# Patient Record
Sex: MALE | Race: WHITE | HISPANIC OR LATINO | Employment: FULL TIME | URBAN - METROPOLITAN AREA
[De-identification: names, ages, dates, MRNs, and addresses within clinical notes are randomized per-mention and may not be internally consistent; named-entity substitution may affect disease eponyms.]

---

## 2017-01-26 ENCOUNTER — OFFICE VISIT (OUTPATIENT)
Dept: MEDICAL GROUP | Facility: CLINIC | Age: 52
End: 2017-01-26
Payer: COMMERCIAL

## 2017-01-26 VITALS
WEIGHT: 163 LBS | SYSTOLIC BLOOD PRESSURE: 122 MMHG | BODY MASS INDEX: 30 KG/M2 | HEART RATE: 71 BPM | DIASTOLIC BLOOD PRESSURE: 70 MMHG | RESPIRATION RATE: 16 BRPM | OXYGEN SATURATION: 98 % | HEIGHT: 62 IN | TEMPERATURE: 97.6 F

## 2017-01-26 DIAGNOSIS — R10.32 LEFT GROIN PAIN: ICD-10-CM

## 2017-01-26 DIAGNOSIS — E78.2 HYPERLIPEMIA, MIXED: ICD-10-CM

## 2017-01-26 DIAGNOSIS — R10.9 LEFT FLANK PAIN: ICD-10-CM

## 2017-01-26 DIAGNOSIS — R73.02 IGT (IMPAIRED GLUCOSE TOLERANCE): ICD-10-CM

## 2017-01-26 LAB
APPEARANCE UR: CLEAR
BILIRUB UR STRIP-MCNC: NORMAL MG/DL
COLOR UR AUTO: YELLOW
GLUCOSE UR STRIP.AUTO-MCNC: NORMAL MG/DL
KETONES UR STRIP.AUTO-MCNC: NORMAL MG/DL
LEUKOCYTE ESTERASE UR QL STRIP.AUTO: NORMAL
NITRITE UR QL STRIP.AUTO: NORMAL
PH UR STRIP.AUTO: 5 [PH] (ref 5–8)
PROT UR QL STRIP: NORMAL MG/DL
RBC UR QL AUTO: NORMAL
SP GR UR STRIP.AUTO: 1.03
UROBILINOGEN UR STRIP-MCNC: NORMAL MG/DL

## 2017-01-26 PROCEDURE — 81002 URINALYSIS NONAUTO W/O SCOPE: CPT | Performed by: NURSE PRACTITIONER

## 2017-01-26 PROCEDURE — 99214 OFFICE O/P EST MOD 30 MIN: CPT | Performed by: NURSE PRACTITIONER

## 2017-01-26 RX ORDER — ERYTHROMYCIN 5 MG/G
OINTMENT OPHTHALMIC
Qty: 3.5 G | Refills: 5 | Status: SHIPPED | OUTPATIENT
Start: 2017-01-26 | End: 2018-02-06 | Stop reason: SDUPTHER

## 2017-01-26 NOTE — MR AVS SNAPSHOT
"        Zaire Anderson   2017 9:00 AM   Office Visit   MRN: [id number]    Department:  [facility]   Dept Phone:  [phone]    Description:  Male : 1965   Provider:  DOM Josue           Reason for Visit     Low Back Pain x 1 month      Allergies as of 2017     No Known Allergies      You were diagnosed with     Left flank pain   [940419]       Left groin pain   [409540]       Hyperlipemia, mixed   [989468]         Vital Signs     Blood Pressure Pulse Temperature Respirations Height Weight    122/70 mmHg 71 36.4 °C (97.6 °F) 16 1.575 m (5' 2\") 73.936 kg (163 lb)    Body Mass Index Oxygen Saturation Smoking Status             29.81 kg/m2 98% Never Smoker          Basic Information     Date Of Birth Sex Race Ethnicity Preferred Language    1965 Male  or   Origin (Cambodian,Pakistani,Turkmen,Doroteo, etc) English      Problem List              ICD-10-CM Priority Class Noted - Resolved    Hyperlipemia, mixed E78.2   2013 - Present    IGT (impaired glucose tolerance) R73.02   2016 - Present      Health Maintenance        Date Due Completion Dates    IMM DTaP/Tdap/Td Vaccine (1 - Tdap) 1986 ---    IMM INFLUENZA (1) 2016 ---            Results     POCT Urinalysis      Component Value Standard Range & Units    POC Color yellow Negative    POC Appearance clear Negative    POC Leukocyte Esterase neg Negative    POC Nitrites neg Negative    POC Urobiligen neg Negative (0.2) mg/dL    POC Protein neg Negative mg/dL    POC Urine PH 5.0 5.0 - 8.0    POC Blood neg Negative    POC Specific Gravity 1.030 <1.005 - >1.030    POC Ketones neg Negative mg/dL    POC Biliruben neg Negative mg/dL    POC Glucose neg Negative mg/dL                        Current Immunizations     No immunizations on file.      Below and/or attached are the medications your provider expects you to take. Review all of your home medications and newly ordered medications " with your provider and/or pharmacist. Follow medication instructions as directed by your provider and/or pharmacist. Please keep your medication list with you and share with your provider. Update the information when medications are discontinued, doses are changed, or new medications (including over-the-counter products) are added; and carry medication information at all times in the event of emergency situations     Allergies:  No Known Allergies          Medications  Valid as of: January 26, 2017 -  9:28 AM    Generic Name Brand Name Tablet Size Instructions for use    Erythromycin (Ointment) erythromycin 5 MG/GM Apply to lid margins bid prn.        Fenofibrate (Tab) TRICOR 145 MG Take 1 Tab by mouth every day.        .                 Medicines prescribed today were sent to:     [id number] - [place], [place] - [address]    [address] [place] [address]    Phone: [phone] Fax: [phone]    Open 24 Hours?: No      Medication refill instructions:       If your prescription bottle indicates you have medication refills left, it is not necessary to call your provider’s office. Please contact your pharmacy and they will refill your medication.    If your prescription bottle indicates you do not have any refills left, you may request refills at any time through one of the following ways: The online [facility] system (except Urgent Care), by calling your provider’s office, or by asking your pharmacy to contact your provider’s office with a refill request. Medication refills are processed only during regular business hours and may not be available until the next business day. Your provider may request additional information or to have a follow-up visit with you prior to refilling your medication.   *Please Note: Medication refills are assigned a new Rx number when refilled electronically. Your pharmacy may indicate that no refills were authorized even though a new prescription for the same medication is  available at the pharmacy. Please request the medicine by name with the pharmacy before contacting your provider for a refill.        Your To Do List     Future Labs/Procedures Complete By Expires    US-ABDOMEN LIMITED  As directed 1/26/2018    US-RENAL  As directed 1/26/2018         MyChart Access Code: Activation code not generated  Current [facility] Status: Active

## 2017-01-26 NOTE — PROGRESS NOTES
"CC: Low Back Pain        HPI:     Zaire presents today for the followin. Left flank pain/ Left groin pain  Patient is here today with concerns related to some left-sided flank pain that is present if he is lying down. He states if he sitting will radiate down into the left lower quadrant and then down sometimes into his groin. Pain will be present on a daily basis however it is not constant throughout the day. Unable to identify triggers that make it worse.  This been going on for about a month on the flanks. Maybe the last 2 weeks where would radiate to the groin.  Denies any changes to the bowels.  Denies any problems urinating.    No changes in appetite etc. No nausea no vomiting  No history kidney stones    Has not tried any home remedies or medications at home    3. Hyperlipemia, mixed  Continues to take his TriCor. No adverse effects of this medication. No history CAD. No complaints of chest pains.    4. IGT (impaired glucose tolerance)  Is watching his sugar. Does not have a significant family history of diabetes. No reports of polys    Current Outpatient Prescriptions   Medication Sig Dispense Refill   • erythromycin 5 MG/GM Ointment Apply to lid margins bid prn. 3.5 g 5   • fenofibrate (TRICOR) 145 MG Tab Take 1 Tab by mouth every day. 30 Tab 11     No current facility-administered medications for this visit.     Social History   Substance Use Topics   • Smoking status: Never Smoker    • Smokeless tobacco: Never Used   • Alcohol Use: No     I reviewed patients allergies, problem list and medications today in [facility].    ROS: Any/all pertinent positives listed in the HPI, otherwise all others reviewed are negative today.      /70 mmHg  Pulse 71  Temp(Src) 36.4 °C (97.6 °F)  Resp 16  Ht 1.575 m (5' 2\")  Wt 73.936 kg (163 lb)  BMI 29.81 kg/m2  SpO2 98%    Exam:    Gen: Alert and oriented, No apparent distress. WDWN, no visible discomfort  Psych: A+Ox3, normal affect and mood  Skin: Warm, dry and " intact. Good turgor   No rashes in visible areas.  Eye: Conjunctiva clear, lids normal  ENMT: Lips without lesions, good dentition  Lungs: Clear to auscultation bilaterally, no rales or rhonchi   Unlabored respiratory effort.   CV: Regular rate and rhythm, S1, S2. No murmurs.   No Edema  Abd: Soft non tender, non distended. Normal active bowel sounds.    No Hepatosplenomegaly, No pulsatile masses.   Mild tenderness with deep palpation to the far left of the umbilicus. This is difficult to reproduce.  Normal gait. Easily moves from sitting to standing, sitting to supine and back.  Office urinalysis is negative for blood leukocytes nitrates. No protein  No tenderness with palpation of the back including the spinous processes and with the lateral musculature.    No CVA tenderness  Assessment and Plan.   51 y.o. male with the following issues.    1. Left flank pain  Stable. Currently unknown etiology. Renal ultrasound to rule out kidney stone or kidney etiology.  - POCT Urinalysis  - US-RENAL; Future    2. Left groin pain  Stable. Normal exam. Will include ultrasound to rule out hernia  - US-INGUINAL HERNIA; Future    3. Hyperlipemia, mixed  Stable. Recheck lipid panel now that he's been on his TriCor  - LIPID PANEL    4. IGT (impaired glucose tolerance)  Stable. Continue diet changes

## 2017-01-30 ENCOUNTER — HOSPITAL ENCOUNTER (OUTPATIENT)
Dept: LAB | Facility: MEDICAL CENTER | Age: 52
End: 2017-01-30
Attending: NURSE PRACTITIONER
Payer: COMMERCIAL

## 2017-01-30 LAB
CHOLEST SERPL-MCNC: 201 MG/DL (ref 100–199)
HDLC SERPL-MCNC: 28 MG/DL
LDLC SERPL CALC-MCNC: ABNORMAL MG/DL
TRIGL SERPL-MCNC: 582 MG/DL (ref 0–149)

## 2017-01-30 PROCEDURE — 36415 COLL VENOUS BLD VENIPUNCTURE: CPT

## 2017-01-30 PROCEDURE — 80061 LIPID PANEL: CPT

## 2017-01-31 ENCOUNTER — TELEPHONE (OUTPATIENT)
Dept: MEDICAL GROUP | Facility: CLINIC | Age: 52
End: 2017-01-31

## 2017-01-31 DIAGNOSIS — E78.2 HYPERLIPEMIA, MIXED: ICD-10-CM

## 2017-01-31 RX ORDER — ATORVASTATIN CALCIUM 20 MG/1
20 TABLET, FILM COATED ORAL DAILY
Qty: 30 TAB | Refills: 11 | Status: SHIPPED | OUTPATIENT
Start: 2017-01-31 | End: 2018-02-19 | Stop reason: SDUPTHER

## 2017-01-31 NOTE — TELEPHONE ENCOUNTER
Please have continue his tricor.  Needs to remember to take it every day.  Add fish oil 2x./day    Will add lipitor at night as his triglycerides are still high.

## 2017-02-10 ENCOUNTER — HOSPITAL ENCOUNTER (OUTPATIENT)
Dept: RADIOLOGY | Facility: MEDICAL CENTER | Age: 52
End: 2017-02-10
Attending: NURSE PRACTITIONER
Payer: COMMERCIAL

## 2017-02-10 DIAGNOSIS — R10.32 LEFT GROIN PAIN: ICD-10-CM

## 2017-02-10 DIAGNOSIS — R10.9 LEFT FLANK PAIN: ICD-10-CM

## 2017-02-10 PROCEDURE — 76857 US EXAM PELVIC LIMITED: CPT

## 2017-02-10 PROCEDURE — 76775 US EXAM ABDO BACK WALL LIM: CPT

## 2018-02-06 RX ORDER — ERYTHROMYCIN 5 MG/G
OINTMENT OPHTHALMIC
Qty: 3.5 G | Refills: 3 | Status: SHIPPED | OUTPATIENT
Start: 2018-02-06 | End: 2019-05-14

## 2018-02-19 DIAGNOSIS — E78.2 HYPERLIPEMIA, MIXED: ICD-10-CM

## 2018-02-19 RX ORDER — ATORVASTATIN CALCIUM 20 MG/1
20 TABLET, FILM COATED ORAL DAILY
Qty: 90 TAB | Refills: 0 | Status: SHIPPED | OUTPATIENT
Start: 2018-02-19 | End: 2019-01-22

## 2018-02-19 NOTE — TELEPHONE ENCOUNTER
Please call and make sure this patient get an annual Fu.  I can order the labs prior to if he is OK with that

## 2018-03-01 ENCOUNTER — HOSPITAL ENCOUNTER (EMERGENCY)
Facility: MEDICAL CENTER | Age: 53
End: 2018-03-01
Attending: EMERGENCY MEDICINE
Payer: COMMERCIAL

## 2018-03-01 ENCOUNTER — APPOINTMENT (OUTPATIENT)
Dept: RADIOLOGY | Facility: MEDICAL CENTER | Age: 53
End: 2018-03-01
Attending: EMERGENCY MEDICINE
Payer: COMMERCIAL

## 2018-03-01 VITALS
BODY MASS INDEX: 27.96 KG/M2 | SYSTOLIC BLOOD PRESSURE: 193 MMHG | HEIGHT: 64 IN | OXYGEN SATURATION: 96 % | DIASTOLIC BLOOD PRESSURE: 87 MMHG | HEART RATE: 64 BPM | RESPIRATION RATE: 18 BRPM | WEIGHT: 163.8 LBS | TEMPERATURE: 98.6 F

## 2018-03-01 DIAGNOSIS — K80.20 CALCULUS OF GALLBLADDER WITHOUT CHOLECYSTITIS WITHOUT OBSTRUCTION: ICD-10-CM

## 2018-03-01 LAB
ALBUMIN SERPL BCP-MCNC: 4.6 G/DL (ref 3.2–4.9)
ALBUMIN/GLOB SERPL: 1.4 G/DL
ALP SERPL-CCNC: 74 U/L (ref 30–99)
ALT SERPL-CCNC: 21 U/L (ref 2–50)
ANION GAP SERPL CALC-SCNC: 12 MMOL/L (ref 0–11.9)
APPEARANCE UR: ABNORMAL
AST SERPL-CCNC: 17 U/L (ref 12–45)
BACTERIA #/AREA URNS HPF: NEGATIVE /HPF
BASOPHILS # BLD AUTO: 0.8 % (ref 0–1.8)
BASOPHILS # BLD: 0.08 K/UL (ref 0–0.12)
BILIRUB SERPL-MCNC: 0.4 MG/DL (ref 0.1–1.5)
BILIRUB UR QL STRIP.AUTO: NEGATIVE
BUN SERPL-MCNC: 19 MG/DL (ref 8–22)
CALCIUM SERPL-MCNC: 9.5 MG/DL (ref 8.5–10.5)
CHLORIDE SERPL-SCNC: 103 MMOL/L (ref 96–112)
CO2 SERPL-SCNC: 26 MMOL/L (ref 20–33)
COLOR UR: YELLOW
CREAT SERPL-MCNC: 1.16 MG/DL (ref 0.5–1.4)
EOSINOPHIL # BLD AUTO: 0.09 K/UL (ref 0–0.51)
EOSINOPHIL NFR BLD: 0.9 % (ref 0–6.9)
EPI CELLS #/AREA URNS HPF: NEGATIVE /HPF
ERYTHROCYTE [DISTWIDTH] IN BLOOD BY AUTOMATED COUNT: 37.1 FL (ref 35.9–50)
GLOBULIN SER CALC-MCNC: 3.2 G/DL (ref 1.9–3.5)
GLUCOSE SERPL-MCNC: 160 MG/DL (ref 65–99)
GLUCOSE UR STRIP.AUTO-MCNC: 100 MG/DL
HCT VFR BLD AUTO: 45.9 % (ref 42–52)
HGB BLD-MCNC: 16.1 G/DL (ref 14–18)
HYALINE CASTS #/AREA URNS LPF: ABNORMAL /LPF
IMM GRANULOCYTES # BLD AUTO: 0.06 K/UL (ref 0–0.11)
IMM GRANULOCYTES NFR BLD AUTO: 0.6 % (ref 0–0.9)
KETONES UR STRIP.AUTO-MCNC: NEGATIVE MG/DL
LEUKOCYTE ESTERASE UR QL STRIP.AUTO: NEGATIVE
LIPASE SERPL-CCNC: 39 U/L (ref 11–82)
LYMPHOCYTES # BLD AUTO: 2.87 K/UL (ref 1–4.8)
LYMPHOCYTES NFR BLD: 28.9 % (ref 22–41)
MCH RBC QN AUTO: 29.9 PG (ref 27–33)
MCHC RBC AUTO-ENTMCNC: 35.1 G/DL (ref 33.7–35.3)
MCV RBC AUTO: 85.2 FL (ref 81.4–97.8)
MICRO URNS: ABNORMAL
MONOCYTES # BLD AUTO: 0.39 K/UL (ref 0–0.85)
MONOCYTES NFR BLD AUTO: 3.9 % (ref 0–13.4)
NEUTROPHILS # BLD AUTO: 6.43 K/UL (ref 1.82–7.42)
NEUTROPHILS NFR BLD: 64.9 % (ref 44–72)
NITRITE UR QL STRIP.AUTO: NEGATIVE
NRBC # BLD AUTO: 0 K/UL
NRBC BLD-RTO: 0 /100 WBC
PH UR STRIP.AUTO: 7.5 [PH]
PLATELET # BLD AUTO: 286 K/UL (ref 164–446)
PMV BLD AUTO: 9.3 FL (ref 9–12.9)
POTASSIUM SERPL-SCNC: 3.4 MMOL/L (ref 3.6–5.5)
PROT SERPL-MCNC: 7.8 G/DL (ref 6–8.2)
PROT UR QL STRIP: NEGATIVE MG/DL
RBC # BLD AUTO: 5.39 M/UL (ref 4.7–6.1)
RBC # URNS HPF: ABNORMAL /HPF
RBC UR QL AUTO: NEGATIVE
SODIUM SERPL-SCNC: 141 MMOL/L (ref 135–145)
SP GR UR STRIP.AUTO: 1.01
TROPONIN I SERPL-MCNC: <0.01 NG/ML (ref 0–0.04)
UROBILINOGEN UR STRIP.AUTO-MCNC: 0.2 MG/DL
WBC # BLD AUTO: 9.9 K/UL (ref 4.8–10.8)
WBC #/AREA URNS HPF: ABNORMAL /HPF

## 2018-03-01 PROCEDURE — 76705 ECHO EXAM OF ABDOMEN: CPT

## 2018-03-01 PROCEDURE — 36415 COLL VENOUS BLD VENIPUNCTURE: CPT

## 2018-03-01 PROCEDURE — A9270 NON-COVERED ITEM OR SERVICE: HCPCS | Performed by: EMERGENCY MEDICINE

## 2018-03-01 PROCEDURE — 99284 EMERGENCY DEPT VISIT MOD MDM: CPT

## 2018-03-01 PROCEDURE — 85025 COMPLETE CBC W/AUTO DIFF WBC: CPT

## 2018-03-01 PROCEDURE — 81001 URINALYSIS AUTO W/SCOPE: CPT

## 2018-03-01 PROCEDURE — 80053 COMPREHEN METABOLIC PANEL: CPT

## 2018-03-01 PROCEDURE — 83690 ASSAY OF LIPASE: CPT

## 2018-03-01 PROCEDURE — 84484 ASSAY OF TROPONIN QUANT: CPT

## 2018-03-01 PROCEDURE — 93005 ELECTROCARDIOGRAM TRACING: CPT | Performed by: EMERGENCY MEDICINE

## 2018-03-01 PROCEDURE — 700102 HCHG RX REV CODE 250 W/ 637 OVERRIDE(OP): Performed by: EMERGENCY MEDICINE

## 2018-03-01 RX ORDER — NAPROXEN 500 MG/1
500 TABLET ORAL 2 TIMES DAILY PRN
Qty: 30 TAB | Refills: 0 | Status: SHIPPED | OUTPATIENT
Start: 2018-03-01 | End: 2019-05-14

## 2018-03-01 RX ORDER — HYDROCODONE BITARTRATE AND ACETAMINOPHEN 5; 325 MG/1; MG/1
1 TABLET ORAL ONCE
Status: COMPLETED | OUTPATIENT
Start: 2018-03-01 | End: 2018-03-01

## 2018-03-01 RX ADMIN — HYDROCODONE BITARTRATE AND ACETAMINOPHEN 1 TABLET: 5; 325 TABLET ORAL at 02:54

## 2018-03-01 ASSESSMENT — PAIN SCALES - GENERAL
PAINLEVEL_OUTOF10: 10
PAINLEVEL_OUTOF10: 10

## 2018-03-01 NOTE — ED TRIAGE NOTES
Zaire Anderson  52 y.o.  male  Chief Complaint   Patient presents with   • Abdominal Pain     Present to triage c/o abdominal pain ( right side ) x 3 hrs. Described as constant pressure / burning pain. Vomited in triage moderate amount.

## 2018-03-01 NOTE — DISCHARGE INSTRUCTIONS
Cólico biliar  (Biliary Colic)  El cólico biliar es un dolor en la región superior del abdomen. El dolor:  · Generalmente, se siente en la región superior derecha del abdomen, татьяна también puede aparecer en la región central, rey debajo del esternón.  · Puede irradiarse a la espalda, hacia el omóplato derecho.  · Puede ser aldair o discontinuo.  · Puede estar acompañado de náuseas y vómitos.  La mayor parte del tiempo, desaparece en el término de 1 a 5 horas. Vik vez que el dolor intenso desaparece, puede francesca dolor abdominal leve que se prolonga aaliyah aproximadamente 24 horas.  La causa del cólico biliar es vik obstrucción en las vías biliares. Las vías biliares son conductos que transportan bilis, un líquido que ayuda a digerir las grasas, desde la vesícula biliar hasta el intestino lozoya. Generalmente, el cólico biliar ocurre después de comer, cuando el sistema digestivo necesita bilis. El dolor aparece cuando las células musculares se contraen bruscamente para intentar  la obstrucción, para que la bilis pueda pasar.  INSTRUCCIONES PARA EL CUIDADO EN EL HOGAR  · [place] los medicamentos solamente abdi se lo haya indicado el médico.  · Jasmine suficiente líquido para mantener la orina dave o de color amarillo pálido.  · Evite los alimentos grasos y fritos. Aggie tipo de alimentos aumentan la demanda de bilis del organismo.  · Evite los alimentos que intensifican el dolor.  · No coma en exceso.  · No ingiera vik comida abundante después de ayunar.  SOLICITE ATENCIÓN MÉDICA SI:  · Tiene fiebre.  · El dolor empeora.  · Vomita.  · Tiene náuseas que le impiden la ingesta de comidas y bebidas.  SOLICITE ATENCIÓN MÉDICA DE INMEDIATO SI:  · De manera repentina, tiene fiebre y escalofríos.  · Observa vik coloración amarillenta (ictericia) de:  ¨ La piel.  ¨ Las partes sai de los ojos.  ¨ Las membranas mucosas.  · Siente dolor continuo o intenso que no se alvaro con los medicamentos.  · Tiene náuseas y vómitos  que no se alivian con los medicamentos.  · Tiene mareos o se desmaya.     Esta información no tiene abdi fin reemplazar el consejo del médico. Asegúrese de hacerle al médico cualquier pregunta que tenga.     Document Released: 03/26/2009 Document Revised: 05/03/2016  Elsevier Interactive Patient Education ©2016 Elsevier Inc.

## 2018-03-01 NOTE — ED NOTES
.All lines and monitors discontinued. Discharge instructions given, questions answered.    Ambulated out of ER, escorted by Spouse.  Instructed not to drive after taking pain medication and pt verbalizes understanding.

## 2018-03-01 NOTE — ED PROVIDER NOTES
"ED Provider Note    Scribed for Michael Jimenez M.D. by Severino Eagle. 3/1/2018, 2:30 AM.    Primary care provider: DOM Josue  Means of arrival: Walk-In  History obtained from: Patient  History limited by: None    CHIEF COMPLAINT  Chief Complaint   Patient presents with   • Abdominal Pain     HPI  Zaire Anderson is a 52 y.o. male who presents to the Emergency Department complaining gradual onset of moderate constant non-radiating right sided abdominal pain. This pain began 3 hours ago accompanied with nausea and one episode of vomiting in triage.   He denies any similar symptoms in the past. Last meal was 7 hours ago. No modifying factors noted.     Denies diarrhea, chest pain, shortness of breath.    REVIEW OF SYSTEMS  See HPI,  Negative for diarrhea, shortness of breath. Remainder of ROS negative.    C.    PAST MEDICAL HISTORY   has a past medical history of Hypercholesteremia.    SURGICAL HISTORY  patient denies any surgical history    SOCIAL HISTORY  Social History   Substance Use Topics   • Smoking status: Never Smoker   • Smokeless tobacco: Never Used   • Alcohol use No      History   Drug Use No     FAMILY HISTORY  Family History   Problem Relation Age of Onset   • Cancer Mother 67     colon ca-survived   • Hypertension Father    • Hyperlipidemia Father    • Cancer Maternal Grandmother      unk   • Diabetes Paternal Grandmother    • Cancer Paternal Grandfather 96     prostate CA-very old     CURRENT MEDICATIONS  Reviewed.  See Encounter Summary.     ALLERGIES  No Known Allergies    PHYSICAL EXAM  VITAL SIGNS: BP (!) 193/87   Pulse 64   Temp 37 °C (98.6 °F)   Resp 18   Ht 1.626 m (5' 4\")   Wt 74.3 kg (163 lb 12.8 oz)   SpO2 97%   BMI 28.12 kg/m²   Constitutional: Awake, alert in no apparent distress.  HENT: Normocephalic, Bilateral external ears normal. Nose normal.   Eyes: Conjunctiva normal, non-icteric, EOMI.    Thorax & Lungs: Easy unlabored respirations, Clear to " ascultation bilaterally.  Cardiovascular: Regular rate, Regular rhythm, No murmurs, rubs or gallops.  Abdomen:  Soft, Normal active bowel signs, Mild RUQ tenderness. Negative murphys. No RLQ tenderness, no rebound, no guarding.   Skin: Visualized skin is  Dry, No erythema, No rash.   Extremities:   No cyanosis, clubbing or edema.  Neurologic: Alert, Grossly non-focal.   Psychiatric: Normal affect, Normal mood    DIAGNOSTIC STUDIES / PROCEDURES     EKG  Interpreted by me    Rhythm:  Normal sinus rhythm   Rate: 86  Axis: normal  Ectopy: none  Conduction: normal  ST Segments: no acute change  T Waves: no acute change  Q Waves: none  Clinical Impression: Normal EKG without acute changes     RADIOLOGY  US-GALLBLADDER   Final Result      Cholelithiasis, with borderline gallbladder wall thickening. No pericholecystic fluid however.        The radiologist's interpretation of all radiological studies have been reviewed by me.    COURSE & MEDICAL DECISION MAKING  Pertinent Labs & Imaging studies reviewed. (See chart for details)    2:30 AM - Patient seen and examined at bedside. Patient will be treated with 1 Tab Norco. Ordered US-Gallbladder, CBC, CMP, Lipase, Troponin, Urinalysis to evaluate his symptoms.     4:45 AM- informed patient of US results. He is pain free at this time. Repeat abdominal exam is non tender.      Decision Making:  This is a 52 y.o. year old male who presents with RUQ abdominal pain. I initially considered cardiac etiology, given the uncontrolled BP. EKG and trop are WNL and the pain is clearly reproducible. Labs are unremarkable. Normal WBC, normal lipase, normal LFTs, normal bili. US reveals cholelithiasis. The wall is borderline in thickness, however clinically he does not have acute cholecystitis. The patient was pain free upon discharge. I discussed biliary colic with the patient and recommend he follow up with general surgery for possible lap nikita. He was instructed on dietary modifications to  avoid flare ups.   He was directed to return for severe pain, fevers, intractable vomiting, chest pain, shortness of breath or any other concern.     The patient was discharged to home in good condition.     FINAL IMPRESSION  1. Calculus of gallbladder without cholecystitis without obstruction          Severino BOWMAN (Scribe), am scribing for, and in the presence of, Michael Jimenez M.D..    Electronically signed by: Severino Eagle (Concepcionibe), 3/1/2018    Michael BOWMAN M.D. personally performed the services described in this documentation, as scribed by Severino Eagle in my presence, and it is both accurate and complete.

## 2018-03-26 LAB — EKG IMPRESSION: NORMAL

## 2018-10-12 DIAGNOSIS — E78.2 HYPERLIPEMIA, MIXED: ICD-10-CM

## 2018-10-12 RX ORDER — ATORVASTATIN CALCIUM 20 MG/1
20 TABLET, FILM COATED ORAL DAILY
Qty: 30 TAB | Refills: 1 | Status: SHIPPED | OUTPATIENT
Start: 2018-10-12 | End: 2019-01-22 | Stop reason: SDUPTHER

## 2018-10-12 RX ORDER — FENOFIBRATE 145 MG/1
TABLET, COATED ORAL
Qty: 30 TAB | Refills: 1 | Status: SHIPPED | OUTPATIENT
Start: 2018-10-12 | End: 2018-12-14 | Stop reason: SDUPTHER

## 2018-10-12 RX ORDER — ERYTHROMYCIN 5 MG/G
OINTMENT OPHTHALMIC
Qty: 3.5 G | Refills: 3 | Status: SHIPPED | OUTPATIENT
Start: 2018-10-12 | End: 2019-05-14

## 2019-01-22 DIAGNOSIS — E78.2 HYPERLIPEMIA, MIXED: ICD-10-CM

## 2019-01-22 RX ORDER — ATORVASTATIN CALCIUM 20 MG/1
TABLET, FILM COATED ORAL
Qty: 90 TAB | Refills: 1 | Status: SHIPPED | OUTPATIENT
Start: 2019-01-22 | End: 2019-05-14 | Stop reason: SDUPTHER

## 2019-05-14 ENCOUNTER — OFFICE VISIT (OUTPATIENT)
Dept: MEDICAL GROUP | Facility: MEDICAL CENTER | Age: 54
End: 2019-05-14
Payer: COMMERCIAL

## 2019-05-14 VITALS
BODY MASS INDEX: 27.49 KG/M2 | WEIGHT: 161 LBS | TEMPERATURE: 98.7 F | SYSTOLIC BLOOD PRESSURE: 140 MMHG | HEIGHT: 64 IN | RESPIRATION RATE: 14 BRPM | HEART RATE: 72 BPM | DIASTOLIC BLOOD PRESSURE: 82 MMHG | OXYGEN SATURATION: 96 %

## 2019-05-14 DIAGNOSIS — R03.0 ELEVATED BP WITHOUT DIAGNOSIS OF HYPERTENSION: ICD-10-CM

## 2019-05-14 DIAGNOSIS — Z12.11 COLON CANCER SCREENING: ICD-10-CM

## 2019-05-14 DIAGNOSIS — M79.605 LEFT LEG PAIN: ICD-10-CM

## 2019-05-14 DIAGNOSIS — Z13.29 SCREENING FOR THYROID DISORDER: ICD-10-CM

## 2019-05-14 DIAGNOSIS — R21 RASH OF HANDS: ICD-10-CM

## 2019-05-14 DIAGNOSIS — R73.02 IGT (IMPAIRED GLUCOSE TOLERANCE): ICD-10-CM

## 2019-05-14 DIAGNOSIS — Z12.5 SCREENING FOR PROSTATE CANCER: ICD-10-CM

## 2019-05-14 DIAGNOSIS — E78.2 HYPERLIPEMIA, MIXED: ICD-10-CM

## 2019-05-14 DIAGNOSIS — H53.9 VISION CHANGES: ICD-10-CM

## 2019-05-14 PROBLEM — I10 HYPERTENSION: Status: ACTIVE | Noted: 2019-05-14

## 2019-05-14 PROCEDURE — 99214 OFFICE O/P EST MOD 30 MIN: CPT | Performed by: NURSE PRACTITIONER

## 2019-05-14 RX ORDER — FENOFIBRATE 145 MG/1
145 TABLET, COATED ORAL
Qty: 90 TAB | Refills: 1 | Status: SHIPPED | OUTPATIENT
Start: 2019-05-14 | End: 2019-10-21 | Stop reason: SDUPTHER

## 2019-05-14 RX ORDER — ATORVASTATIN CALCIUM 20 MG/1
20 TABLET, FILM COATED ORAL
Qty: 90 TAB | Refills: 1 | Status: SHIPPED | OUTPATIENT
Start: 2019-05-14 | End: 2019-10-08

## 2019-05-14 RX ORDER — LISINOPRIL 10 MG/1
10 TABLET ORAL DAILY
Qty: 90 TAB | Refills: 1 | Status: SHIPPED | OUTPATIENT
Start: 2019-05-14 | End: 2019-10-21 | Stop reason: SDUPTHER

## 2019-05-14 RX ORDER — CLOBETASOL PROPIONATE 0.5 MG/G
1 OINTMENT TOPICAL 2 TIMES DAILY
Qty: 30 G | Refills: 3 | Status: SHIPPED | OUTPATIENT
Start: 2019-05-14 | End: 2019-11-03 | Stop reason: SDUPTHER

## 2019-05-14 ASSESSMENT — PATIENT HEALTH QUESTIONNAIRE - PHQ9: CLINICAL INTERPRETATION OF PHQ2 SCORE: 0

## 2019-05-14 NOTE — PROGRESS NOTES
CC: Eye Problem (Vision changes; 1 month ago, left leg wasn't hurting but when being touched, it caused pain. Stopped 1 week ago.)        HPI:     Zaire presents today for the followin. Vision changes  States is been noticing for some time that he has been having blurred vision if he tries to read really small prescription however if he backs up he can read the prescription without any problems.  Does occasionally get red eyes.  Occasionally his eyes are dry.  Previously would use an erythromycin ointment for this however not recently.  Wants to know if that would be helpful    2. Left leg pain  States about a month ago he had 2 weeks where he would have a weird sensation on the top of his left upper leg/quad.  States no pain with walking his leg normally however if he touched it with his hand it would feel numb sometimes tingly's, sometimes burning.  This completely resolved about a week ago.  He tried icy hot, unsure if that helped    3. Hyperlipemia, mixed  Does still take TriCor and atorvastatin however he admits he only takes it sometimes    4. IGT (impaired glucose tolerance)  History of impaired glucose tolerance, no recent checks.  No reports of polys    5. Elevated BP without diagnosis of hypertension  States he has been checking his blood pressure over the last year at the pharmacy and will get numbers with systolics between 140s and 150s.  Diastolics 85 and above.  Has never been diagnosed with hypertension.  Does not take medication for blood pressure previously    9. Rash of hands  States chronically for years he has had a purely red rash on his left palm.  He states he was given some sort of creams which would help in the beginning however the rash returned after he stopped using them.  At some point he also saw a dermatologist which gave him an unknown cream but the similar process happened.    Current Outpatient Prescriptions   Medication Sig Dispense Refill   • atorvastatin (LIPITOR) 20 MG Tab  "Take 1 Tab by mouth every day. 90 Tab 1   • fenofibrate (TRICOR) 145 MG Tab Take 1 Tab by mouth every day. 90 Tab 1   • clobetasol (TEMOVATE) 0.05 % Ointment Apply 1 Application to affected area(s) 2 times a day. 30 g 3   • lisinopril (PRINIVIL) 10 MG Tab Take 1 Tab by mouth every day. 90 Tab 1     No current facility-administered medications for this visit.      Social History   Substance Use Topics   • Smoking status: Never Smoker   • Smokeless tobacco: Never Used   • Alcohol use No     I reviewed patients allergies, problem list and medications today in [facility].    ROS: Any/all pertinent positives listed in the HPI, otherwise all others reviewed are negative today.      /82 (BP Location: Left arm, Patient Position: Sitting, BP Cuff Size: Adult)   Pulse 72   Temp 37.1 °C (98.7 °F) (Temporal)   Resp 14   Ht 1.626 m (5' 4\")   Wt 73 kg (161 lb)   SpO2 96%   BMI 27.64 kg/m²     Exam:    Gen: Alert and oriented, No apparent distress. WDWN  Psych: A+Ox3, normal affect and mood  Skin: Warm, dry and intact. Good turgor   No rashes in visible areas.  Eye: Conjunctiva clear, lids normal  ENMT: Lips without lesions, good dentition  Neck: No Lymphadenopathy, Thyromegaly, Bruits.   Trachea midline, no masses  Lungs: Clear to auscultation bilaterally, no rales or rhonchi   Unlabored respiratory effort.   CV: Regular rate and rhythm, S1, S2. No murmurs.   No Edema  Manual blood pressure, left arm, sitting 158/88      Assessment and Plan.   53 y.o. male with the following issues.    1. Vision changes  Recommend seeing a optometrist to see if he needs reading glasses.  At this point I do not recommend using recurrent antibiotic ointments without an infection.  We discussed he does have a pterygium which may be contributing to some dryness and redness    2. Left leg pain  Resolved at this point.    3. Hyperlipemia, mixed  At this point I think it would be in his best interest to start taking the medications daily and in 2 " months ago for lab work at that point.  Discussed this will allow me to adjust dosing as needed  - Lipid Profile; Future  - Comp Metabolic Panel; Future  - atorvastatin (LIPITOR) 20 MG Tab; Take 1 Tab by mouth every day.  Dispense: 90 Tab; Refill: 1  - fenofibrate (TRICOR) 145 MG Tab; Take 1 Tab by mouth every day.  Dispense: 90 Tab; Refill: 1    4. IGT (impaired glucose tolerance)  Patient will continue healthy diet, low sugar content.  Monitor labs  - HEMOGLOBIN A1C; Future  - Comp Metabolic Panel; Future    5. Screening for thyroid disorder  Ordered  - TSH; Future    6. Screening for prostate cancer  Ordered  - PROSTATE SPECIFIC AG    7. Colon cancer screening  Ordered.  Patient is low risk with no family history of colon cancer  - COLOGUARD (FIT DNA)     hypertension  Start lisinopril once a day.  Recheck BP when he returns to me in 2 months after labs.  - lisinopril (PRINIVIL) 10 MG Tab; Take 1 Tab by mouth every day.  Dispense: 90 Tab; Refill: 1    9. Rash of hands  Trial of clobetasol.  We discussed emollients.  We discussed he could use a glove over his hand after he treats it.  Discussed twice daily for the next couple weeks and then hopefully he is able to reduce to daily or cure every other day.  If he does not get good improvement we will refer back to dermatology  - clobetasol (TEMOVATE) 0.05 % Ointment; Apply 1 Application to affected area(s) 2 times a day.  Dispense: 30 g; Refill: 3

## 2019-09-30 ENCOUNTER — OFFICE VISIT (OUTPATIENT)
Dept: MEDICAL GROUP | Facility: MEDICAL CENTER | Age: 54
End: 2019-09-30
Payer: COMMERCIAL

## 2019-09-30 VITALS
WEIGHT: 160.4 LBS | HEIGHT: 64 IN | TEMPERATURE: 98.3 F | SYSTOLIC BLOOD PRESSURE: 142 MMHG | HEART RATE: 77 BPM | BODY MASS INDEX: 27.39 KG/M2 | DIASTOLIC BLOOD PRESSURE: 76 MMHG | OXYGEN SATURATION: 96 %

## 2019-09-30 DIAGNOSIS — R73.02 IGT (IMPAIRED GLUCOSE TOLERANCE): ICD-10-CM

## 2019-09-30 DIAGNOSIS — R22.1 NECK MASS: ICD-10-CM

## 2019-09-30 DIAGNOSIS — E78.2 HYPERLIPEMIA, MIXED: ICD-10-CM

## 2019-09-30 PROCEDURE — 99214 OFFICE O/P EST MOD 30 MIN: CPT | Performed by: NURSE PRACTITIONER

## 2019-09-30 NOTE — PROGRESS NOTES
"CC: Nodule        HPI:     Zaire presents today for the followin. Neck mass  Here today stating that for the last month he is noticed a bump on the left side of his neck.  Does not hurt.  Does not seem to be growing.  However states has not shrunk or gone away.  States he had been really tense in his neck and back and he was not sure if this was the precipitating reason.  Denies other associated symptoms such as fevers.    2. IGT (impaired glucose tolerance)  Has a history of some prediabetes.  He had pending labs that were ordered back in May however lost the paperwork    3. Hyperlipemia, mixed  On statin.  Had pending labs that were ordered in May however lost the paperwork.    Current Outpatient Medications   Medication Sig Dispense Refill   • atorvastatin (LIPITOR) 20 MG Tab Take 1 Tab by mouth every day. 90 Tab 1   • fenofibrate (TRICOR) 145 MG Tab Take 1 Tab by mouth every day. 90 Tab 1   • clobetasol (TEMOVATE) 0.05 % Ointment Apply 1 Application to affected area(s) 2 times a day. 30 g 3   • lisinopril (PRINIVIL) 10 MG Tab Take 1 Tab by mouth every day. 90 Tab 1     No current facility-administered medications for this visit.      Social History     Tobacco Use   • Smoking status: Never Smoker   • Smokeless tobacco: Never Used   Substance Use Topics   • Alcohol use: No   • Drug use: No     I reviewed patients allergies, problem list and medications today in [facility].    ROS: Any/all pertinent positives listed in the HPI, otherwise all others reviewed are negative today.      /76 (BP Location: Right arm, Patient Position: Sitting, BP Cuff Size: Adult)   Pulse 77   Temp 36.8 °C (98.3 °F) (Temporal)   Ht 1.626 m (5' 4\")   Wt 72.8 kg (160 lb 6.4 oz)   SpO2 96%   BMI 27.53 kg/m²     Exam:   Gen: Alert and oriented, No apparent distress. WDWN  Psych: A+Ox3, normal affect and mood  Skin: Warm, dry and intact. Good turgor   No rashes in visible areas.  Eye: Conjunctiva clear, lids normal  ENMT: Lips " without lesions, good dentition   Oropharynx clear.   Neck: No additional lymphadenopathy, Thyromegaly, Bruits.   Trachea midline  Left side of the neck around the area of the bottom of the posterior cervical chain there is an approximate 1 cm firm slightly mobile subcutaneous mass.  No other associated lymphadenopathy  Lungs: Clear to auscultation bilaterally, no rales or rhonchi   Unlabored respiratory effort.   CV: Regular rate and rhythm, S1, S2. No murmurs.   No Edema  Gait normal      Assessment and Plan.   53 y.o. male with the following issues.    1. Neck mass  Stable.  Feels consistent with a lipoma.  Ultrasound pending.  Discussed depending on what type of tissue it may or may not resolve.  - US-SOFT TISSUES OF HEAD - NECK; Future  - CBC WITH DIFFERENTIAL; Future    2. IGT (impaired glucose tolerance)  Pending labs.  These were printed out for him and I reviewed with him in detail how to make an appointment.    3. Hyperlipemia, mixed  Pending labs      Declines flu shot

## 2019-10-07 ENCOUNTER — HOSPITAL ENCOUNTER (OUTPATIENT)
Dept: LAB | Facility: MEDICAL CENTER | Age: 54
End: 2019-10-07
Attending: NURSE PRACTITIONER
Payer: COMMERCIAL

## 2019-10-07 DIAGNOSIS — R73.02 IGT (IMPAIRED GLUCOSE TOLERANCE): ICD-10-CM

## 2019-10-07 DIAGNOSIS — E78.2 HYPERLIPEMIA, MIXED: ICD-10-CM

## 2019-10-07 DIAGNOSIS — Z13.29 SCREENING FOR THYROID DISORDER: ICD-10-CM

## 2019-10-07 DIAGNOSIS — R22.1 NECK MASS: ICD-10-CM

## 2019-10-07 LAB
ALBUMIN SERPL BCP-MCNC: 4.2 G/DL (ref 3.2–4.9)
ALBUMIN/GLOB SERPL: 1.4 G/DL
ALP SERPL-CCNC: 58 U/L (ref 30–99)
ALT SERPL-CCNC: 21 U/L (ref 2–50)
ANION GAP SERPL CALC-SCNC: 5 MMOL/L (ref 0–11.9)
AST SERPL-CCNC: 17 U/L (ref 12–45)
BASOPHILS # BLD AUTO: 0.9 % (ref 0–1.8)
BASOPHILS # BLD: 0.06 K/UL (ref 0–0.12)
BILIRUB SERPL-MCNC: 0.4 MG/DL (ref 0.1–1.5)
BUN SERPL-MCNC: 15 MG/DL (ref 8–22)
CALCIUM SERPL-MCNC: 8.6 MG/DL (ref 8.5–10.5)
CHLORIDE SERPL-SCNC: 108 MMOL/L (ref 96–112)
CHOLEST SERPL-MCNC: 198 MG/DL (ref 100–199)
CO2 SERPL-SCNC: 27 MMOL/L (ref 20–33)
CREAT SERPL-MCNC: 0.96 MG/DL (ref 0.5–1.4)
EOSINOPHIL # BLD AUTO: 0.19 K/UL (ref 0–0.51)
EOSINOPHIL NFR BLD: 2.8 % (ref 0–6.9)
ERYTHROCYTE [DISTWIDTH] IN BLOOD BY AUTOMATED COUNT: 39.5 FL (ref 35.9–50)
FASTING STATUS PATIENT QL REPORTED: NORMAL
GLOBULIN SER CALC-MCNC: 2.9 G/DL (ref 1.9–3.5)
GLUCOSE SERPL-MCNC: 95 MG/DL (ref 65–99)
HCT VFR BLD AUTO: 45.7 % (ref 42–52)
HDLC SERPL-MCNC: 24 MG/DL
HGB BLD-MCNC: 14.8 G/DL (ref 14–18)
LDLC SERPL CALC-MCNC: ABNORMAL MG/DL
LYMPHOCYTES # BLD AUTO: 4.49 K/UL (ref 1–4.8)
LYMPHOCYTES NFR BLD: 67 % (ref 22–41)
MANUAL DIFF BLD: NORMAL
MCH RBC QN AUTO: 29.3 PG (ref 27–33)
MCHC RBC AUTO-ENTMCNC: 32.4 G/DL (ref 33.7–35.3)
MCV RBC AUTO: 90.5 FL (ref 81.4–97.8)
MONOCYTES # BLD AUTO: 0.38 K/UL (ref 0–0.85)
MONOCYTES NFR BLD AUTO: 5.7 % (ref 0–13.4)
MORPHOLOGY BLD-IMP: NORMAL
NEUTROPHILS # BLD AUTO: 1.58 K/UL (ref 1.82–7.42)
NEUTROPHILS NFR BLD: 23.6 % (ref 44–72)
NRBC # BLD AUTO: 0 K/UL
NRBC BLD-RTO: 0 /100 WBC
PLATELET # BLD AUTO: 280 K/UL (ref 164–446)
PLATELET BLD QL SMEAR: NORMAL
PMV BLD AUTO: 9.9 FL (ref 9–12.9)
POTASSIUM SERPL-SCNC: 4.5 MMOL/L (ref 3.6–5.5)
PROT SERPL-MCNC: 7.1 G/DL (ref 6–8.2)
RBC # BLD AUTO: 5.05 M/UL (ref 4.7–6.1)
RBC BLD AUTO: PRESENT
SMUDGE CELLS BLD QL SMEAR: NORMAL
SODIUM SERPL-SCNC: 140 MMOL/L (ref 135–145)
TRIGL SERPL-MCNC: 790 MG/DL (ref 0–149)
TSH SERPL DL<=0.005 MIU/L-ACNC: 1.46 UIU/ML (ref 0.38–5.33)
WBC # BLD AUTO: 6.7 K/UL (ref 4.8–10.8)

## 2019-10-07 PROCEDURE — 80053 COMPREHEN METABOLIC PANEL: CPT

## 2019-10-07 PROCEDURE — 84443 ASSAY THYROID STIM HORMONE: CPT

## 2019-10-07 PROCEDURE — 80061 LIPID PANEL: CPT

## 2019-10-07 PROCEDURE — 85027 COMPLETE CBC AUTOMATED: CPT

## 2019-10-07 PROCEDURE — 83036 HEMOGLOBIN GLYCOSYLATED A1C: CPT

## 2019-10-07 PROCEDURE — 85007 BL SMEAR W/DIFF WBC COUNT: CPT

## 2019-10-07 PROCEDURE — 36415 COLL VENOUS BLD VENIPUNCTURE: CPT

## 2019-10-08 ENCOUNTER — TELEPHONE (OUTPATIENT)
Dept: MEDICAL GROUP | Facility: MEDICAL CENTER | Age: 54
End: 2019-10-08

## 2019-10-08 DIAGNOSIS — E78.2 HYPERLIPEMIA, MIXED: ICD-10-CM

## 2019-10-08 DIAGNOSIS — R79.89 ABNORMAL CBC: ICD-10-CM

## 2019-10-08 DIAGNOSIS — Z12.5 SCREENING FOR MALIGNANT NEOPLASM OF PROSTATE: ICD-10-CM

## 2019-10-08 LAB
EST. AVERAGE GLUCOSE BLD GHB EST-MCNC: 111 MG/DL
HBA1C MFR BLD: 5.5 % (ref 0–5.6)

## 2019-10-08 RX ORDER — ROSUVASTATIN CALCIUM 40 MG/1
40 TABLET, COATED ORAL DAILY
Qty: 90 TAB | Refills: 3 | Status: ON HOLD | OUTPATIENT
Start: 2019-10-08 | End: 2020-06-19

## 2019-10-08 NOTE — TELEPHONE ENCOUNTER
Sammarinese speaker    Please call and let him know his blood sugar is normal however his cholesterol still very elevated.  I sent in a new cholesterol medication (Crestor) to start taking daily in place of the Lipitor.  We should recheck labs in 3 to 6 months.  Order pending.  He will follow-up in the office at that point.

## 2019-10-08 NOTE — TELEPHONE ENCOUNTER
Patient notified and verbalized understanding. Mailed lab orders. I spoke to him in English and he asked questions, which I answered. He understands he can look these up in mychart as well.

## 2019-10-10 ENCOUNTER — HOSPITAL ENCOUNTER (OUTPATIENT)
Dept: RADIOLOGY | Facility: MEDICAL CENTER | Age: 54
End: 2019-10-10
Attending: NURSE PRACTITIONER
Payer: COMMERCIAL

## 2019-10-10 ENCOUNTER — TELEPHONE (OUTPATIENT)
Dept: MEDICAL GROUP | Facility: MEDICAL CENTER | Age: 54
End: 2019-10-10

## 2019-10-10 DIAGNOSIS — R22.1 NECK MASS: ICD-10-CM

## 2019-10-10 PROCEDURE — 76536 US EXAM OF HEAD AND NECK: CPT

## 2019-10-10 NOTE — TELEPHONE ENCOUNTER
Please call lucila  He prefers Eritrean'      Would like him to make an appt to recheck his neck lump and discuss US results

## 2019-10-11 NOTE — TELEPHONE ENCOUNTER
used, Leslie ID# [id number], relayed info to patient and scheduled him for the next available which is 10/21/19.

## 2019-10-21 ENCOUNTER — OFFICE VISIT (OUTPATIENT)
Dept: MEDICAL GROUP | Facility: MEDICAL CENTER | Age: 54
End: 2019-10-21
Payer: COMMERCIAL

## 2019-10-21 VITALS
TEMPERATURE: 98.4 F | OXYGEN SATURATION: 98 % | RESPIRATION RATE: 14 BRPM | WEIGHT: 159 LBS | HEART RATE: 79 BPM | BODY MASS INDEX: 27.14 KG/M2 | HEIGHT: 64 IN | SYSTOLIC BLOOD PRESSURE: 142 MMHG | DIASTOLIC BLOOD PRESSURE: 84 MMHG

## 2019-10-21 DIAGNOSIS — I10 HYPERTENSION, UNSPECIFIED TYPE: ICD-10-CM

## 2019-10-21 DIAGNOSIS — J39.2 THROAT IRRITATION: ICD-10-CM

## 2019-10-21 DIAGNOSIS — R73.02 IGT (IMPAIRED GLUCOSE TOLERANCE): ICD-10-CM

## 2019-10-21 DIAGNOSIS — R79.89 ABNORMAL CBC: ICD-10-CM

## 2019-10-21 DIAGNOSIS — I10 ESSENTIAL HYPERTENSION: ICD-10-CM

## 2019-10-21 DIAGNOSIS — R59.0 ENLARGED LYMPH NODE IN NECK: ICD-10-CM

## 2019-10-21 DIAGNOSIS — R22.1 NECK MASS: ICD-10-CM

## 2019-10-21 DIAGNOSIS — E78.2 HYPERLIPEMIA, MIXED: ICD-10-CM

## 2019-10-21 PROCEDURE — 99214 OFFICE O/P EST MOD 30 MIN: CPT | Performed by: NURSE PRACTITIONER

## 2019-10-21 RX ORDER — FLUTICASONE PROPIONATE 50 MCG
2 SPRAY, SUSPENSION (ML) NASAL DAILY
Qty: 1 BOTTLE | Refills: 11 | Status: ON HOLD | OUTPATIENT
Start: 2019-10-21 | End: 2020-06-19

## 2019-10-21 RX ORDER — LISINOPRIL 20 MG/1
20 TABLET ORAL DAILY
Qty: 90 TAB | Refills: 3 | Status: SHIPPED | OUTPATIENT
Start: 2019-10-21 | End: 2020-12-30 | Stop reason: SDUPTHER

## 2019-10-21 RX ORDER — FENOFIBRATE 145 MG/1
145 TABLET, COATED ORAL
Qty: 90 TAB | Refills: 1 | Status: SHIPPED | OUTPATIENT
Start: 2019-10-21 | End: 2020-10-29

## 2019-10-21 NOTE — PROGRESS NOTES
CC: Results        HPI:     Zaire presents today for the followin. Abnormal CBC/Neck mass/Enlarged lymph node in neck  Here today following up on results from earlier this month.  At his last appointment he complained of a bump just behind his left clavicle.  We did order an ultrasound which showed an abnormally enlarged lymph node of unknown etiology.  Recommendation for FNA.  He did have a CBC which showed increased lymphocytes no anemia.  No thrombocytopenia.  Denies fevers, weight loss, sweats.    4. Hypertension, unspecified type  On lisinopril 10 mg daily.  Last 3 visits including today have been subpar control    5. Hyperlipemia, mixed  Received a phone call earlier this month regarding his labs which showed significant mixed hyperlipidemia, which is known to the patient's history.  He has previously had good control with a statin and TriCor.  Unfortunately there was some confusion he stopped the TriCor and just started the Crestor at that time.    6. Throat irritation  States for the last few weeks has been having some dryness in his throat.  Sometimes will have a little bit of phlegm.  No sneezing.  Does state that sometimes if he talks a lot his throat will be really dry    7.  IGT (impaired glucose tolerance)  History of impaired glucose tolerance.  Currently has a normal fasting sugar and a normal A1c.    Current Outpatient Medications   Medication Sig Dispense Refill   • lisinopril (PRINIVIL) 20 MG Tab Take 1 Tab by mouth every day. 90 Tab 3   • fluticasone (FLONASE) 50 MCG/ACT nasal spray Spray 2 Sprays in nose every day. 1 Bottle 11   • fenofibrate (TRICOR) 145 MG Tab Take 1 Tab by mouth every day. 90 Tab 1   • rosuvastatin (CRESTOR) 40 MG tablet Take 1 Tab by mouth every day. 90 Tab 3   • clobetasol (TEMOVATE) 0.05 % Ointment Apply 1 Application to affected area(s) 2 times a day. 30 g 3     No current facility-administered medications for this visit.      Social History     Tobacco Use   •  "Smoking status: Never Smoker   • Smokeless tobacco: Never Used   Substance Use Topics   • Alcohol use: No   • Drug use: No     I reviewed patients allergies, problem list and medications today in EPIC.    ROS: Any/all pertinent positives listed in the HPI, otherwise all others reviewed are negative today.      /84 (BP Location: Left arm, Patient Position: Sitting, BP Cuff Size: Adult)   Pulse 79   Temp 36.9 °C (98.4 °F) (Temporal)   Resp 14   Ht 1.626 m (5' 4\")   Wt 72.1 kg (159 lb)   SpO2 98%   BMI 27.29 kg/m²     Exam:   Gen: Alert and oriented, No apparent distress. WDWN  Psych: A+Ox3, normal affect and mood  Skin: Warm, dry and intact. Good turgor   No rashes in visible areas.  Eye: Conjunctiva clear, lids normal  ENMT: Lips without lesions, good dentition   Oropharynx clear-does have some posterior oropharynx erythema consistent with postnasal drainage  Neck: No other lymphadenopathy, Thyromegaly, Bruits.   Trachea midline, no masses  Lungs: Clear to auscultation bilaterally, no rales or rhonchi   Unlabored respiratory effort.   CV: Regular rate and rhythm, S1, S2. No murmurs.   No Edema        Assessment and Plan.   53 y.o. male with the following issues.    1. Abnormal CBC/Neck mass/Enlarged lymph node in neck  FNA ordered.  Reviewed with patient unsure if this is large as a reaction or related to possible malignancy.  He appears to be asymptomatic.  We will also monitor his CBC  -CBC  - US-NEEDLE BX-SOFT TISSUE NECK-CHEST; Future    3. Hypertension, unspecified type  Increase to 20 mg daily  - lisinopril (PRINIVIL) 20 MG Tab; Take 1 Tab by mouth every day.  Dispense: 90 Tab; Refill: 3    5. Hyperlipemia, mixed  Continue Crestor.  Restart TriCor.  Labs in approximately 2 months.  We did discuss reducing fats, red meats oils etc. in diet and increasing fruit vegetable and whole grains  - fenofibrate (TRICOR) 145 MG Tab; Take 1 Tab by mouth every day.  Dispense: 90 Tab; Refill: 1  - Comp Metabolic " Panel; Future  - Lipid Profile; Future    6. Throat irritation  Suspect more related postnasal drip.  Trial of Flonase.  Patient is encouraged to follow-up with the office if not improving  - fluticasone (FLONASE) 50 MCG/ACT nasal spray; Spray 2 Sprays in nose every day.  Dispense: 1 Bottle; Refill: 11    7.  IGT (impaired glucose tolerance)  Will monitor routinely

## 2019-10-29 ENCOUNTER — HOSPITAL ENCOUNTER (OUTPATIENT)
Dept: RADIOLOGY | Facility: MEDICAL CENTER | Age: 54
End: 2019-10-29
Attending: NURSE PRACTITIONER
Payer: COMMERCIAL

## 2019-10-29 DIAGNOSIS — R79.89 ABNORMAL CBC: ICD-10-CM

## 2019-10-29 DIAGNOSIS — R22.1 NECK MASS: ICD-10-CM

## 2019-10-29 LAB
GRAM STN SPEC: NORMAL
PATHOLOGY CONSULT NOTE: NORMAL
SIGNIFICANT IND 70042: NORMAL
SITE SITE: NORMAL
SOURCE SOURCE: NORMAL

## 2019-10-29 PROCEDURE — 87102 FUNGUS ISOLATION CULTURE: CPT

## 2019-10-29 PROCEDURE — 88360 TUMOR IMMUNOHISTOCHEM/MANUAL: CPT

## 2019-10-29 PROCEDURE — 88342 IMHCHEM/IMCYTCHM 1ST ANTB: CPT

## 2019-10-29 PROCEDURE — 88341 IMHCHEM/IMCYTCHM EA ADD ANTB: CPT | Mod: 91

## 2019-10-29 PROCEDURE — 87015 SPECIMEN INFECT AGNT CONCNTJ: CPT

## 2019-10-29 PROCEDURE — 87070 CULTURE OTHR SPECIMN AEROBIC: CPT

## 2019-10-29 PROCEDURE — 87116 MYCOBACTERIA CULTURE: CPT

## 2019-10-29 PROCEDURE — 76942 ECHO GUIDE FOR BIOPSY: CPT

## 2019-10-29 PROCEDURE — 88305 TISSUE EXAM BY PATHOLOGIST: CPT

## 2019-10-29 PROCEDURE — 87205 SMEAR GRAM STAIN: CPT

## 2019-10-29 PROCEDURE — 87206 SMEAR FLUORESCENT/ACID STAI: CPT

## 2019-10-29 NOTE — PROGRESS NOTES
"OP[facility] Nursing Note:    Patient given [facility] \"Preventing the Spread of Infection\" brochure upon arrival.    US guided Biopsy of Left Neck Mass done by Dr. Dr. Abad, Left neck access site.  3 cores in Formalin, 1 core in RPMI collected by Dr. Abad and sent to pathology.  1 core in Saline collected by Dr. Abad and sent for for C&S and AFB Fungal sent to Microbiology.     All questions and concerns answered prior to being dc'd; pt provided with appropriate education for procedure, pt discharge to home.  "

## 2019-10-30 LAB
RHODAMINE-AURAMINE STN SPEC: NORMAL
SIGNIFICANT IND 70042: NORMAL
SITE SITE: NORMAL
SOURCE SOURCE: NORMAL

## 2019-11-01 LAB
BACTERIA TISS AEROBE CULT: NORMAL
GRAM STN SPEC: NORMAL
SIGNIFICANT IND 70042: NORMAL
SITE SITE: NORMAL
SOURCE SOURCE: NORMAL

## 2019-11-03 DIAGNOSIS — R21 RASH OF HANDS: ICD-10-CM

## 2019-11-03 RX ORDER — CLOBETASOL PROPIONATE 0.5 MG/G
1 OINTMENT TOPICAL 2 TIMES DAILY
Qty: 30 G | Refills: 3 | Status: SHIPPED | OUTPATIENT
Start: 2019-11-03 | End: 2020-04-13 | Stop reason: SDUPTHER

## 2019-11-27 LAB
FUNGUS SPEC CULT: NORMAL
SIGNIFICANT IND 70042: NORMAL
SITE SITE: NORMAL
SOURCE SOURCE: NORMAL

## 2019-12-24 LAB
MYCOBACTERIUM SPEC CULT: NORMAL
RHODAMINE-AURAMINE STN SPEC: NORMAL
SIGNIFICANT IND 70042: NORMAL
SITE SITE: NORMAL
SOURCE SOURCE: NORMAL

## 2020-04-08 ENCOUNTER — TELEPHONE (OUTPATIENT)
Dept: MEDICAL GROUP | Facility: MEDICAL CENTER | Age: 55
End: 2020-04-08

## 2020-04-08 NOTE — TELEPHONE ENCOUNTER
Juan Jose Blackburn, The patient left a voicemail asking if you or joann has completed the letter he left? I called and he said he left it in the fron t but hasn't heard anything back

## 2020-04-13 ENCOUNTER — OFFICE VISIT (OUTPATIENT)
Dept: MEDICAL GROUP | Facility: MEDICAL CENTER | Age: 55
End: 2020-04-13
Payer: COMMERCIAL

## 2020-04-13 ENCOUNTER — TELEPHONE (OUTPATIENT)
Dept: MEDICAL GROUP | Facility: MEDICAL CENTER | Age: 55
End: 2020-04-13

## 2020-04-13 VITALS
DIASTOLIC BLOOD PRESSURE: 80 MMHG | BODY MASS INDEX: 27.31 KG/M2 | OXYGEN SATURATION: 97 % | HEART RATE: 79 BPM | TEMPERATURE: 97.1 F | SYSTOLIC BLOOD PRESSURE: 148 MMHG | HEIGHT: 64 IN | RESPIRATION RATE: 14 BRPM | WEIGHT: 160 LBS

## 2020-04-13 DIAGNOSIS — I10 ESSENTIAL HYPERTENSION: ICD-10-CM

## 2020-04-13 DIAGNOSIS — Z56.0 PROBLEM WITH BEING UNEMPLOYED: ICD-10-CM

## 2020-04-13 DIAGNOSIS — R59.0 ENLARGED LYMPH NODE IN NECK: ICD-10-CM

## 2020-04-13 DIAGNOSIS — R21 RASH OF HANDS: ICD-10-CM

## 2020-04-13 DIAGNOSIS — R79.89 ABNORMAL CBC: ICD-10-CM

## 2020-04-13 DIAGNOSIS — E78.2 MIXED HYPERLIPIDEMIA: ICD-10-CM

## 2020-04-13 PROCEDURE — 99214 OFFICE O/P EST MOD 30 MIN: CPT | Performed by: NURSE PRACTITIONER

## 2020-04-13 RX ORDER — CLOBETASOL PROPIONATE 0.5 MG/G
1 OINTMENT TOPICAL 2 TIMES DAILY
Qty: 30 G | Refills: 3 | Status: ON HOLD | OUTPATIENT
Start: 2020-04-13 | End: 2020-06-19

## 2020-04-13 SDOH — ECONOMIC STABILITY - INCOME SECURITY: UNEMPLOYMENT, UNSPECIFIED: Z56.0

## 2020-04-13 ASSESSMENT — FIBROSIS 4 INDEX: FIB4 SCORE: 0.69

## 2020-04-13 NOTE — PROGRESS NOTES
CC: Letter for School/Work (Paperwork )        HPI:     Zaire presents today for the followin. Problem with being unemployed  Here today to complete paperwork for unemployment specifically related to disability.  Patient was called on the office as he has not been seen in 6 months to ensure that nothing is changed healthwise.  He states that he works for [employer], and currently related to Covid19, he has lost his job.  He meant to apply for regular unemployment due to loss of his job but accidentally applied for disability related unemployment.  He has not had any physical changes in his health.    2. Enlarged lymph node in neck  FNA done in 2019 was normal.  States the lymph node has completely resolved    3. Abnormal CBC  Pending repeat test    4. Hyperlipemia, mixed  Admits that he only takes fenofibrate and rosuvastatin intermittently.  Maybe every 5 days.  He denies that he has any adverse reaction to these medications    5. Rash of hands  States that previously was in a refill of clobetasol which worked well for rash on his hands.  He states normally it may be $10-$15 to  a tube however recently was $40 and he was concerned if there was a change in insurance etc.    6. Essential hypertension  Last took his lisinopril 5 days ago.  Again the patient personally does not like taking medications daily.  He does not have any adverse effect to the medication    Current Outpatient Medications   Medication Sig Dispense Refill   • clobetasol (TEMOVATE) 0.05 % Ointment Apply 1 Application to affected area(s) 2 times a day. 30 g 3   • lisinopril (PRINIVIL) 20 MG Tab Take 1 Tab by mouth every day. 90 Tab 3   • fluticasone (FLONASE) 50 MCG/ACT nasal spray Spray 2 Sprays in nose every day. 1 Bottle 11   • fenofibrate (TRICOR) 145 MG Tab Take 1 Tab by mouth every day. 90 Tab 1   • rosuvastatin (CRESTOR) 40 MG tablet Take 1 Tab by mouth every day. 90 Tab 3     No current facility-administered medications for  "this visit.      Social History     Tobacco Use   • Smoking status: Never Smoker   • Smokeless tobacco: Never Used   Substance Use Topics   • Alcohol use: No   • Drug use: No     I reviewed patients allergies, problem list and medications today in EPIC.    ROS: Any/all pertinent positives listed in the HPI, otherwise all others reviewed are negative today.      /80 (BP Location: Left arm, Patient Position: Sitting, BP Cuff Size: Large adult)   Pulse 79   Temp 36.2 °C (97.1 °F) (Temporal)   Resp 14   Ht 1.626 m (5' 4\")   Wt 72.6 kg (160 lb)   SpO2 97%   BMI 27.46 kg/m²     Exam:    Gen: Alert and oriented, No apparent distress. WDWN  Psych: A+Ox3, normal affect and mood  Skin: Warm, dry and intact. Good turgor   No rashes in visible areas.  Eye: Conjunctiva clear, lids normal  ENMT: Lips without lesions, good dentition  Neck: No Lymphadenopathy, Thyromegaly, Bruits.   Trachea midline, no masses  Lungs: Clear to auscultation bilaterally, no rales or rhonchi   Unlabored respiratory effort.   CV: Regular rate and rhythm, S1, S2. No murmurs.   No Edema        Assessment and Plan.   54 y.o. male with the following issues.    1. Problem with being unemployed  Patient is physically able to work.  He erroneously applied for this type of disability.  We will fax a letter to them with his claim number.  Discussed I will not complete the form unless desired by disability to not further confuse the process.    2. Enlarged lymph node in neck  Resolved within normal FNA    3. Abnormal CBC  Has pending labs to repeat    4. Hyperlipemia, mixed  Discussed with patient to take the rosuvastatin daily.  He may hold off on the fenofibrate.  He already has pending labs to check a lipid panel.  Recommend waiting for 2 months of daily statin use prior to going to lab    5. Rash of hands  Recent prescription.  We will see if pharmacy requires a formulary change  - clobetasol (TEMOVATE) 0.05 % Ointment; Apply 1 Application to " affected area(s) 2 times a day.  Dispense: 30 g; Refill: 3    6. Essential hypertension  We reviewed that his blood pressure is elevated today.  Reviewed the process of medications and the need to be taken daily to work well.  Patient is encouraged to take this medication daily to improve his blood pressure

## 2020-04-13 NOTE — TELEPHONE ENCOUNTER
Patient requires office visit to complete. He was scheduled a visit today @ 8:40 am, which he missed.

## 2020-04-13 NOTE — LETTER
"April 13, 2020         Patient: Zaire He   YOB: 1965   Date of Visit: 4/13/2020   CLAIM # [id number]        To Whom it May Concern:    Ziare He was seen in my clinic on 4/13/2020.  He accidentally applied for unemployment related to disability however this was erroneous.  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  Please notify me if you need further information OR if you would like the \"medical statement\" form completed however showing that he has no physical disabilities.    If you have any questions or concerns, please don't hesitate to call.        Sincerely,           CHRISTINA Zhang.  Electronically Signed     "

## 2020-06-16 ENCOUNTER — OFFICE VISIT (OUTPATIENT)
Dept: ADMISSIONS | Facility: MEDICAL CENTER | Age: 55
End: 2020-06-16
Attending: SURGERY
Payer: COMMERCIAL

## 2020-06-16 DIAGNOSIS — Z01.812 PRE-OPERATIVE LABORATORY EXAMINATION: ICD-10-CM

## 2020-06-16 LAB — COVID ORDER STATUS COVID19: NORMAL

## 2020-06-16 PROCEDURE — C9803 HOPD COVID-19 SPEC COLLECT: HCPCS

## 2020-06-16 PROCEDURE — U0003 INFECTIOUS AGENT DETECTION BY NUCLEIC ACID (DNA OR RNA); SEVERE ACUTE RESPIRATORY SYNDROME CORONAVIRUS 2 (SARS-COV-2) (CORONAVIRUS DISEASE [COVID-19]), AMPLIFIED PROBE TECHNIQUE, MAKING USE OF HIGH THROUGHPUT TECHNOLOGIES AS DESCRIBED BY CMS-2020-01-R: HCPCS

## 2020-06-17 LAB
SARS-COV-2 RNA RESP QL NAA+PROBE: NOTDETECTED
SPECIMEN SOURCE: NORMAL

## 2020-06-18 DIAGNOSIS — Z01.810 PRE-OPERATIVE CARDIOVASCULAR EXAMINATION: ICD-10-CM

## 2020-06-18 LAB
ALBUMIN SERPL BCP-MCNC: 4.3 G/DL (ref 3.2–4.9)
ALBUMIN/GLOB SERPL: 1.4 G/DL
ALP SERPL-CCNC: 54 U/L (ref 30–99)
ALT SERPL-CCNC: 16 U/L (ref 2–50)
ANION GAP SERPL CALC-SCNC: 13 MMOL/L (ref 7–16)
AST SERPL-CCNC: <5 U/L (ref 12–45)
BILIRUB SERPL-MCNC: 0.3 MG/DL (ref 0.1–1.5)
BUN SERPL-MCNC: 15 MG/DL (ref 8–22)
CALCIUM SERPL-MCNC: 9.4 MG/DL (ref 8.5–10.5)
CHLORIDE SERPL-SCNC: 105 MMOL/L (ref 96–112)
CO2 SERPL-SCNC: 24 MMOL/L (ref 20–33)
CREAT SERPL-MCNC: 1.16 MG/DL (ref 0.5–1.4)
GLOBULIN SER CALC-MCNC: 3 G/DL (ref 1.9–3.5)
GLUCOSE SERPL-MCNC: 99 MG/DL (ref 65–99)
POTASSIUM SERPL-SCNC: 4.4 MMOL/L (ref 3.6–5.5)
PROT SERPL-MCNC: 7.3 G/DL (ref 6–8.2)
SODIUM SERPL-SCNC: 142 MMOL/L (ref 135–145)

## 2020-06-18 PROCEDURE — 80053 COMPREHEN METABOLIC PANEL: CPT

## 2020-06-18 ASSESSMENT — FIBROSIS 4 INDEX: FIB4 SCORE: 0.69

## 2020-06-19 ENCOUNTER — ANESTHESIA EVENT (OUTPATIENT)
Dept: SURGERY | Facility: MEDICAL CENTER | Age: 55
End: 2020-06-19
Payer: COMMERCIAL

## 2020-06-19 ENCOUNTER — HOSPITAL ENCOUNTER (OUTPATIENT)
Facility: MEDICAL CENTER | Age: 55
End: 2020-06-19
Attending: SURGERY | Admitting: SURGERY
Payer: COMMERCIAL

## 2020-06-19 ENCOUNTER — ANESTHESIA (OUTPATIENT)
Dept: SURGERY | Facility: MEDICAL CENTER | Age: 55
End: 2020-06-19
Payer: COMMERCIAL

## 2020-06-19 VITALS
OXYGEN SATURATION: 96 % | SYSTOLIC BLOOD PRESSURE: 134 MMHG | DIASTOLIC BLOOD PRESSURE: 87 MMHG | HEIGHT: 64 IN | TEMPERATURE: 98.4 F | WEIGHT: 156.31 LBS | HEART RATE: 73 BPM | RESPIRATION RATE: 16 BRPM | BODY MASS INDEX: 26.69 KG/M2

## 2020-06-19 DIAGNOSIS — G89.18 POSTOPERATIVE PAIN: ICD-10-CM

## 2020-06-19 DIAGNOSIS — K81.1 CHOLECYSTITIS, CHRONIC: ICD-10-CM

## 2020-06-19 LAB
ERYTHROCYTE [DISTWIDTH] IN BLOOD BY AUTOMATED COUNT: 40.3 FL (ref 35.9–50)
HCT VFR BLD AUTO: 41.6 % (ref 42–52)
HGB BLD-MCNC: 14.2 G/DL (ref 14–18)
MCH RBC QN AUTO: 30 PG (ref 27–33)
MCHC RBC AUTO-ENTMCNC: 34.1 G/DL (ref 33.7–35.3)
MCV RBC AUTO: 87.8 FL (ref 81.4–97.8)
PATHOLOGY CONSULT NOTE: NORMAL
PLATELET # BLD AUTO: 278 K/UL (ref 164–446)
PMV BLD AUTO: 9.3 FL (ref 9–12.9)
RBC # BLD AUTO: 4.74 M/UL (ref 4.7–6.1)
WBC # BLD AUTO: 6.9 K/UL (ref 4.8–10.8)

## 2020-06-19 PROCEDURE — 160036 HCHG PACU - EA ADDL 30 MINS PHASE I: Performed by: SURGERY

## 2020-06-19 PROCEDURE — 501582 HCHG TROCAR, THRD BLADED: Performed by: SURGERY

## 2020-06-19 PROCEDURE — 700102 HCHG RX REV CODE 250 W/ 637 OVERRIDE(OP): Performed by: ANESTHESIOLOGY

## 2020-06-19 PROCEDURE — 501838 HCHG SUTURE GENERAL: Performed by: SURGERY

## 2020-06-19 PROCEDURE — 160009 HCHG ANES TIME/MIN: Performed by: SURGERY

## 2020-06-19 PROCEDURE — A9270 NON-COVERED ITEM OR SERVICE: HCPCS | Performed by: ANESTHESIOLOGY

## 2020-06-19 PROCEDURE — 501583 HCHG TROCAR, THRD CAN&SEAL 5X100: Performed by: SURGERY

## 2020-06-19 PROCEDURE — 700102 HCHG RX REV CODE 250 W/ 637 OVERRIDE(OP): Performed by: SURGERY

## 2020-06-19 PROCEDURE — 160039 HCHG SURGERY MINUTES - EA ADDL 1 MIN LEVEL 3: Performed by: SURGERY

## 2020-06-19 PROCEDURE — 502571 HCHG PACK, LAP CHOLE: Performed by: SURGERY

## 2020-06-19 PROCEDURE — 501584 HCHG TROCAR, THRD CAN&SEAL11X100: Performed by: SURGERY

## 2020-06-19 PROCEDURE — 160035 HCHG PACU - 1ST 60 MINS PHASE I: Performed by: SURGERY

## 2020-06-19 PROCEDURE — 93005 ELECTROCARDIOGRAM TRACING: CPT | Performed by: SURGERY

## 2020-06-19 PROCEDURE — 700101 HCHG RX REV CODE 250: Performed by: ANESTHESIOLOGY

## 2020-06-19 PROCEDURE — 85027 COMPLETE CBC AUTOMATED: CPT

## 2020-06-19 PROCEDURE — 700111 HCHG RX REV CODE 636 W/ 250 OVERRIDE (IP)

## 2020-06-19 PROCEDURE — 501399 HCHG SPECIMAN BAG, ENDO CATC: Performed by: SURGERY

## 2020-06-19 PROCEDURE — 88304 TISSUE EXAM BY PATHOLOGIST: CPT

## 2020-06-19 PROCEDURE — 700111 HCHG RX REV CODE 636 W/ 250 OVERRIDE (IP): Performed by: ANESTHESIOLOGY

## 2020-06-19 PROCEDURE — 160002 HCHG RECOVERY MINUTES (STAT): Performed by: SURGERY

## 2020-06-19 PROCEDURE — 501572 HCHG TROCAR, SHIELD OBTU 5X100: Performed by: SURGERY

## 2020-06-19 PROCEDURE — 700105 HCHG RX REV CODE 258: Performed by: SURGERY

## 2020-06-19 PROCEDURE — 700101 HCHG RX REV CODE 250: Performed by: SURGERY

## 2020-06-19 PROCEDURE — 160048 HCHG OR STATISTICAL LEVEL 1-5: Performed by: SURGERY

## 2020-06-19 PROCEDURE — A9270 NON-COVERED ITEM OR SERVICE: HCPCS | Performed by: SURGERY

## 2020-06-19 PROCEDURE — 500868 HCHG NEEDLE, SURGI(VARES): Performed by: SURGERY

## 2020-06-19 PROCEDURE — A6402 STERILE GAUZE <= 16 SQ IN: HCPCS | Performed by: SURGERY

## 2020-06-19 PROCEDURE — 160028 HCHG SURGERY MINUTES - 1ST 30 MINS LEVEL 3: Performed by: SURGERY

## 2020-06-19 RX ORDER — OXYCODONE AND ACETAMINOPHEN 7.5; 325 MG/1; MG/1
1 TABLET ORAL EVERY 6 HOURS PRN
Qty: 20 TAB | Refills: 0 | Status: SHIPPED | OUTPATIENT
Start: 2020-06-19 | End: 2020-06-26

## 2020-06-19 RX ORDER — LIDOCAINE HYDROCHLORIDE 40 MG/ML
SOLUTION TOPICAL PRN
Status: DISCONTINUED | OUTPATIENT
Start: 2020-06-19 | End: 2020-06-19 | Stop reason: SURG

## 2020-06-19 RX ORDER — CEFAZOLIN SODIUM 1 G/3ML
INJECTION, POWDER, FOR SOLUTION INTRAMUSCULAR; INTRAVENOUS PRN
Status: DISCONTINUED | OUTPATIENT
Start: 2020-06-19 | End: 2020-06-19 | Stop reason: SURG

## 2020-06-19 RX ORDER — HYDROMORPHONE HYDROCHLORIDE 1 MG/ML
0.2 INJECTION, SOLUTION INTRAMUSCULAR; INTRAVENOUS; SUBCUTANEOUS
Status: DISCONTINUED | OUTPATIENT
Start: 2020-06-19 | End: 2020-06-19 | Stop reason: HOSPADM

## 2020-06-19 RX ORDER — HYDROMORPHONE HYDROCHLORIDE 1 MG/ML
0.4 INJECTION, SOLUTION INTRAMUSCULAR; INTRAVENOUS; SUBCUTANEOUS
Status: DISCONTINUED | OUTPATIENT
Start: 2020-06-19 | End: 2020-06-19 | Stop reason: HOSPADM

## 2020-06-19 RX ORDER — BUPIVACAINE HYDROCHLORIDE AND EPINEPHRINE 5; 5 MG/ML; UG/ML
INJECTION, SOLUTION EPIDURAL; INTRACAUDAL; PERINEURAL
Status: DISCONTINUED | OUTPATIENT
Start: 2020-06-19 | End: 2020-06-19 | Stop reason: HOSPADM

## 2020-06-19 RX ORDER — HYDROMORPHONE HYDROCHLORIDE 1 MG/ML
0.1 INJECTION, SOLUTION INTRAMUSCULAR; INTRAVENOUS; SUBCUTANEOUS
Status: DISCONTINUED | OUTPATIENT
Start: 2020-06-19 | End: 2020-06-19 | Stop reason: HOSPADM

## 2020-06-19 RX ORDER — OXYCODONE HCL 5 MG/5 ML
10 SOLUTION, ORAL ORAL
Status: COMPLETED | OUTPATIENT
Start: 2020-06-19 | End: 2020-06-19

## 2020-06-19 RX ORDER — ONDANSETRON 2 MG/ML
4 INJECTION INTRAMUSCULAR; INTRAVENOUS
Status: DISCONTINUED | OUTPATIENT
Start: 2020-06-19 | End: 2020-06-19 | Stop reason: HOSPADM

## 2020-06-19 RX ORDER — SODIUM CHLORIDE, SODIUM LACTATE, POTASSIUM CHLORIDE, CALCIUM CHLORIDE 600; 310; 30; 20 MG/100ML; MG/100ML; MG/100ML; MG/100ML
INJECTION, SOLUTION INTRAVENOUS CONTINUOUS
Status: DISCONTINUED | OUTPATIENT
Start: 2020-06-19 | End: 2020-06-19 | Stop reason: HOSPADM

## 2020-06-19 RX ORDER — MEPERIDINE HYDROCHLORIDE 25 MG/ML
12.5 INJECTION INTRAMUSCULAR; INTRAVENOUS; SUBCUTANEOUS
Status: DISCONTINUED | OUTPATIENT
Start: 2020-06-19 | End: 2020-06-19 | Stop reason: HOSPADM

## 2020-06-19 RX ORDER — LIDOCAINE HYDROCHLORIDE 10 MG/ML
INJECTION, SOLUTION EPIDURAL; INFILTRATION; INTRACAUDAL; PERINEURAL
Status: COMPLETED
Start: 2020-06-19 | End: 2020-06-19

## 2020-06-19 RX ORDER — DIPHENHYDRAMINE HYDROCHLORIDE 50 MG/ML
12.5 INJECTION INTRAMUSCULAR; INTRAVENOUS
Status: DISCONTINUED | OUTPATIENT
Start: 2020-06-19 | End: 2020-06-19 | Stop reason: HOSPADM

## 2020-06-19 RX ORDER — HALOPERIDOL 5 MG/ML
1 INJECTION INTRAMUSCULAR
Status: DISCONTINUED | OUTPATIENT
Start: 2020-06-19 | End: 2020-06-19 | Stop reason: HOSPADM

## 2020-06-19 RX ORDER — NEOSTIGMINE METHYLSULFATE 1 MG/ML
INJECTION, SOLUTION INTRAVENOUS PRN
Status: DISCONTINUED | OUTPATIENT
Start: 2020-06-19 | End: 2020-06-19 | Stop reason: SURG

## 2020-06-19 RX ORDER — ONDANSETRON 4 MG/1
4 TABLET, FILM COATED ORAL EVERY 6 HOURS PRN
Qty: 6 TAB | Refills: 0 | Status: SHIPPED | OUTPATIENT
Start: 2020-06-19 | End: 2020-06-26

## 2020-06-19 RX ORDER — OXYCODONE HCL 5 MG/5 ML
5 SOLUTION, ORAL ORAL
Status: COMPLETED | OUTPATIENT
Start: 2020-06-19 | End: 2020-06-19

## 2020-06-19 RX ADMIN — OXYCODONE HYDROCHLORIDE 10 MG: 5 SOLUTION ORAL at 15:43

## 2020-06-19 RX ADMIN — FENTANYL CITRATE 50 MCG: 50 INJECTION INTRAMUSCULAR; INTRAVENOUS at 15:42

## 2020-06-19 RX ADMIN — SODIUM CHLORIDE, POTASSIUM CHLORIDE, SODIUM LACTATE AND CALCIUM CHLORIDE: 600; 310; 30; 20 INJECTION, SOLUTION INTRAVENOUS at 14:15

## 2020-06-19 RX ADMIN — Medication 0.5 ML: at 13:25

## 2020-06-19 RX ADMIN — FENTANYL CITRATE 50 MCG: 50 INJECTION INTRAMUSCULAR; INTRAVENOUS at 14:43

## 2020-06-19 RX ADMIN — PROPOFOL 160 MG: 10 INJECTION, EMULSION INTRAVENOUS at 14:25

## 2020-06-19 RX ADMIN — POVIDONE-IODINE 15 ML: 10 SOLUTION TOPICAL at 13:25

## 2020-06-19 RX ADMIN — MEPERIDINE HYDROCHLORIDE 12.5 MG: 25 INJECTION INTRAMUSCULAR; INTRAVENOUS; SUBCUTANEOUS at 16:45

## 2020-06-19 RX ADMIN — HYDROMORPHONE HYDROCHLORIDE 0.4 MG: 1 INJECTION, SOLUTION INTRAMUSCULAR; INTRAVENOUS; SUBCUTANEOUS at 15:50

## 2020-06-19 RX ADMIN — LIDOCAINE HYDROCHLORIDE 0.5 ML: 10 INJECTION, SOLUTION EPIDURAL; INFILTRATION; INTRACAUDAL at 13:25

## 2020-06-19 RX ADMIN — GLYCOPYRROLATE 0.2 MG: 0.2 INJECTION INTRAMUSCULAR; INTRAVENOUS at 15:10

## 2020-06-19 RX ADMIN — FENTANYL CITRATE 50 MCG: 50 INJECTION INTRAMUSCULAR; INTRAVENOUS at 14:26

## 2020-06-19 RX ADMIN — HYDROMORPHONE HYDROCHLORIDE 0.2 MG: 1 INJECTION, SOLUTION INTRAMUSCULAR; INTRAVENOUS; SUBCUTANEOUS at 16:30

## 2020-06-19 RX ADMIN — ROCURONIUM BROMIDE 35 MG: 10 INJECTION, SOLUTION INTRAVENOUS at 14:26

## 2020-06-19 RX ADMIN — FENTANYL CITRATE 50 MCG: 50 INJECTION INTRAMUSCULAR; INTRAVENOUS at 16:00

## 2020-06-19 RX ADMIN — LIDOCAINE HYDROCHLORIDE 160 MG: 40 SOLUTION TOPICAL at 14:27

## 2020-06-19 RX ADMIN — CEFAZOLIN 2 G: 330 INJECTION, POWDER, FOR SOLUTION INTRAMUSCULAR; INTRAVENOUS at 14:15

## 2020-06-19 RX ADMIN — FENTANYL CITRATE 50 MCG: 50 INJECTION INTRAMUSCULAR; INTRAVENOUS at 14:47

## 2020-06-19 RX ADMIN — SODIUM CHLORIDE, POTASSIUM CHLORIDE, SODIUM LACTATE AND CALCIUM CHLORIDE: 600; 310; 30; 20 INJECTION, SOLUTION INTRAVENOUS at 13:26

## 2020-06-19 RX ADMIN — FENTANYL CITRATE 50 MCG: 50 INJECTION INTRAMUSCULAR; INTRAVENOUS at 14:20

## 2020-06-19 RX ADMIN — HYDROMORPHONE HYDROCHLORIDE 0.4 MG: 1 INJECTION, SOLUTION INTRAMUSCULAR; INTRAVENOUS; SUBCUTANEOUS at 16:05

## 2020-06-19 RX ADMIN — NEOSTIGMINE METHYLSULFATE 2 MG: 1 INJECTION INTRAVENOUS at 14:59

## 2020-06-19 RX ADMIN — GLYCOPYRROLATE 0.3 MG: 0.2 INJECTION INTRAMUSCULAR; INTRAVENOUS at 14:59

## 2020-06-19 ASSESSMENT — FIBROSIS 4 INDEX: FIB4 SCORE: 0.21

## 2020-06-19 ASSESSMENT — PAIN SCALES - GENERAL: PAIN_LEVEL: 2

## 2020-06-19 NOTE — ANESTHESIA PREPROCEDURE EVALUATION
Relevant Problems   CARDIAC   (+) Essential hypertension   (+) Hypertension       Physical Exam    Anesthesia Plan    ASA 2       Plan - general       Airway plan will be ETT        Induction: intravenous    Postoperative Plan: Postoperative administration of opioids is intended.    Pertinent diagnostic labs and testing reviewed    Informed Consent:    Anesthetic plan and risks discussed with patient.    Use of blood products discussed with: patient whom consented to blood products.

## 2020-06-19 NOTE — ANESTHESIA POSTPROCEDURE EVALUATION
Patient: Zaire He    Procedure Summary     Date:  06/19/20 Room / Location:  [place] [address] / SURGERY [facility]    Anesthesia Start:  1415 Anesthesia Stop:  1517    Procedure:  CHOLECYSTECTOMY, LAPAROSCOPIC (Abdomen) Diagnosis:  (CHOLELITHIASIS WITH CHORNIC CHOLECYSTITIS)    Surgeon:  Jose Young M.D. Responsible Provider:  Ranjan Reyes M.D.    Anesthesia Type:  general ASA Status:  2          Final Anesthesia Type: general  Last vitals  BP   Blood Pressure: (!) 167/87(Rn notified)    Temp   36.2 °C (97.2 °F)    Pulse   Pulse: 97   Resp   17    SpO2   97 %      Anesthesia Post Evaluation    Patient location during evaluation: PACU  Patient participation: complete - patient participated  Level of consciousness: awake and alert  Pain score: 2    Airway patency: patent  Anesthetic complications: no  Cardiovascular status: hemodynamically stable  Respiratory status: acceptable  Hydration status: euvolemic    PONV: none           Nurse Pain Score: 0 (NPRS)

## 2020-06-19 NOTE — ANESTHESIA PREPROCEDURE EVALUATION
Relevant Problems   CARDIAC   (+) Essential hypertension   (+) Hypertension       Physical Exam    Airway   Mallampati: II  TM distance: >3 FB  Neck ROM: full       Cardiovascular - normal exam  Rhythm: regular  Rate: normal  (-) murmur     Dental - normal exam           Pulmonary - normal exam  Breath sounds clear to auscultation     Abdominal   Abdomen: soft  Bowel sounds: normal   Neurological - normal exam                 Anesthesia Plan

## 2020-06-19 NOTE — OR SURGEON
Immediate Post OP Note    PreOp Diagnosis: Chronic Cholecystitis and Cholelithiasis    PostOp Diagnosis: Same    Procedure(s):  CHOLECYSTECTOMY, LAPAROSCOPIC - Wound Class: Clean Contaminated    Surgeon(s):  MARY Blount M.D.    Anesthesiologist/Type of Anesthesia:GET  Anesthesiologist: Ranjan Reyes M.D./General    Surgical Staff:  Circulator: Lukas D. Gansert, R.N.  Relief Scrub: Shannon Pop  Scrub Person: Gonzalo Mendez    Specimens removed if any:  ID Type Source Tests Collected by Time Destination   A : Gallbladder Tissue Gallbladder PATHOLOGY SPECIMEN Jose Young M.D. 6/19/2020 12:56 PM        Estimated Blood Loss: < 10 cc    Findings:  No acute inflammation or ductal dilation    Complications: none        6/19/2020 3:11 PM Jose Young M.D.

## 2020-06-19 NOTE — OR NURSING
Respirations easy in PACU.  Op sites times 4 clean and dry with Tegaderm; gauze under umbilical site clean and dry. Pain meds with good effect.  Denies nausea.

## 2020-06-19 NOTE — ANESTHESIA TIME REPORT
Anesthesia Start and Stop Event Times     Date Time Event    6/19/2020 1413 Ready for Procedure     1415 Anesthesia Start     1517 Anesthesia Stop        Responsible Staff  06/19/20    Name Role Begin End    Ranjan Reyes M.D. Anesth 1415 1517        Preop Diagnosis (Free Text):  Pre-op Diagnosis     CHOLELITHIASIS WITH CHORNIC CHOLECYSTITIS        Preop Diagnosis (Codes):    Post op Diagnosis  Cholelithiasis      Premium Reason  Non-Premium    Comments:

## 2020-06-19 NOTE — ANESTHESIA QCDR
2019 [facility] Clinical Data Registry (for Quality Improvement)     Postoperative nausea/vomiting risk protocol (Adult = 18 yrs and Pediatric 3-17 yrs)- (430 and 463)  General inhalation anesthetic (NOT TIVA) with PONV risk factors: Yes  Provision of anti-emetic therapy with at least 2 different classes of agents: Yes   Patient DID NOT receive anti-emetic therapy and reason is documented in Medical Record:  N/A    Multimodal Pain Management- (477)  Non-emergent surgery AND patient age >= 18:   Use of Multimodal Pain Management, two or more drugs and/or interventions, NOT including systemic opioids:   Exception: Documented allergy to multiple classes of analgesics:     Smoking Abstinence (404)  Patient is current smoker (cigarette, pipe, e-cig, marijuanna):   Elective Surgery:   Abstinence instructions provided prior to day of surgery:   Patient abstained from smoking on day of surgery:     Pre-Op Beta-Blocker in Isolated CABG (44)  Isolated CABG AND patient age >= 18:   Beta-blocker admin within 24 hours of surgical incision:   Exception:of medical reason(s) for not administering beta blocker within 24 hours prior to surgical incision (e.g., not  indicated,other medical reason):     PACU assessment of acute postoperative pain prior to Anesthesia Care End- Applies to Patients Age = 18- (ABG7)  Initial PACU pain score is which of the following: < 7/10  Patient unable to report pain score: N/A    Post-anesthetic transfer of care checklist/protocol to PACU/ICU- (426 and 427)  Upon conclusion of case, patient transferred to which of the following locations: PACU/Non-ICU  Use of transfer checklist/protocol: Yes  Exclusion: Service Performed in Patient Hospital Room (and thus did not require transfer): N/A  Unplanned admission to ICU related to anesthesia service up through end of PACU care- (MD51)  Unplanned admission to ICU (not initially anticipated at anesthesia start time): Yes

## 2020-06-19 NOTE — ANESTHESIA PROCEDURE NOTES
Airway    Date/Time: 6/19/2020 2:23 PM  Performed by: Ranjan Reyes M.D.  Authorized by: Ranjan Reyes M.D.     Location:  OR  Urgency:  Elective  Indications for Airway Management:  Anesthesia      Spontaneous Ventilation: absent    Sedation Level:  Deep  Preoxygenated: Yes    Patient Position:  Sniffing  Final Airway Type:  Endotracheal airway  Final Endotracheal Airway:  ETT  Cuffed: Yes    Technique Used for Successful ETT Placement:  Direct laryngoscopy    Insertion Site:  Oral  Blade Type:  Shashank  Laryngoscope Blade/Videolaryngoscope Blade Size:  3  ETT Size (mm):  8.0  Measured from:  Teeth  ETT to Teeth (cm):  22  Placement Verified by: auscultation and capnometry    Cormack-Lehane Classification:  Grade IIa - partial view of glottis  Number of Attempts at Approach:  1

## 2020-06-19 NOTE — OR NURSING
Pre op admission completed.  Pt and wife educated on surgical plan of care, all questions answered.  Bed in low position and call light within reach.  Hourly rounding in place.  This RN performed excellent hand hygiene and wore a surgical mask at all times.  Pt wore a mask at all times except when doing oral and nasal triple aim care.

## 2020-06-20 LAB — EKG IMPRESSION: NORMAL

## 2020-06-20 PROCEDURE — 93010 ELECTROCARDIOGRAM REPORT: CPT | Performed by: INTERNAL MEDICINE

## 2020-06-20 NOTE — OP REPORT
DATE OF SERVICE:  06/19/2020    PREOPERATIVE DIAGNOSES:  Chronic cholecystitis and cholelithiasis.    POSTOPERATIVE DIAGNOSES:  Chronic cholecystitis and cholelithiasis.    PROCEDURE:  Laparoscopic cholecystectomy.    ANESTHESIA:  General endotracheal.    ANESTHESIOLOGIST:  Ranjan Reyes MD    SURGEON:  Jose Young MD    FIRST ASSISTANT:  Elgin Rodriguez MD    INDICATIONS:  Patient with symptomatic gallstones, having more frequent   attacks.    OPERATIVE FINDINGS:  No acute inflammation or ductal dilatation.    OPERATIVE NOTE:  Patient was taken to the operating room, placed in supine   position, given general endotracheal anesthesia.  Once properly anesthetized,   he was prepped and draped in the usual sterile fashion.  A 0.5% with   epinephrine was used to anesthetize the skin.  Supraumbilically, a transverse   incision was made and carried down through the skin and subcutaneous tissue.    Towel clamp was used to grab the umbilical stalk and tent the abdominal wall   upward.  A Veress needle was inserted without resistance.  Aspiration and   flushing revealed no abnormality.  Pneumoperitoneum was obtained.  An 11 mm   trocar port was placed.  Camera was inserted.  General exploration was carried   out.  There was no apparent injury from Veress needle or trocar placement.    Under direct vision, the 11 mm epigastric and 2 lateral 5 mm ports were placed   after the skin had been anesthetized with 0.5% Marcaine with epinephrine.    The gallbladder was grasped at its fundus and infundibulum and was tented up   over the liver.  Peritoneal reflection was stripped down to expose the cystic   duct and artery.  These were clearly identified and clipped proximally and   distally with proximal end x2 on the artery and x3 on the duct.  Additional   smaller venous arterial branch was also clipped in the peritoneal reflection   of the gallbladder.  Gallbladder was brought off the liver bed with hook    electrocautery.  There was no spillage of biliary contents.  The gallbladder   was removed with an EndoCatch bag out of the umbilical port site out of the   right upper quadrant port site, epigastric port site.  The liver bed was   inspected.  Bleeding points were controlled with electrocautery.  The abdomen   was irrigated out extensively with saline until clear.  The liver bed was   inspected.  There was no bleeding or bile leaks from the liver bed, duct, or   artery.  Under direct vision, the epigastric port site was closed with a 0   Vicryl Endoclose.  The umbilical port site was closed with a figure-of-eight 0   Vicryl suture.  All skin incisions were closed with 4-0 Vicryl subcuticular   closures.  Patient tolerated the procedure well.  There were no apparent   complications.  Lap, sponge and instrument counts were correct.       ____________________________________     MD RUSSEL Issa / KSENIA    DD:  06/19/2020 15:21:04  DT:  06/19/2020 17:53:56    D#:  [id number]  Job#:  [id number]

## 2020-12-30 ENCOUNTER — OFFICE VISIT (OUTPATIENT)
Dept: MEDICAL GROUP | Facility: MEDICAL CENTER | Age: 55
End: 2020-12-30
Payer: COMMERCIAL

## 2020-12-30 VITALS
OXYGEN SATURATION: 94 % | HEIGHT: 64 IN | BODY MASS INDEX: 26.29 KG/M2 | DIASTOLIC BLOOD PRESSURE: 64 MMHG | WEIGHT: 154 LBS | SYSTOLIC BLOOD PRESSURE: 124 MMHG | RESPIRATION RATE: 18 BRPM | HEART RATE: 75 BPM

## 2020-12-30 DIAGNOSIS — R73.02 IGT (IMPAIRED GLUCOSE TOLERANCE): ICD-10-CM

## 2020-12-30 DIAGNOSIS — E78.2 HYPERLIPEMIA, MIXED: ICD-10-CM

## 2020-12-30 DIAGNOSIS — I10 ESSENTIAL HYPERTENSION: ICD-10-CM

## 2020-12-30 PROCEDURE — 99214 OFFICE O/P EST MOD 30 MIN: CPT | Performed by: FAMILY MEDICINE

## 2020-12-30 RX ORDER — LISINOPRIL 20 MG/1
20 TABLET ORAL DAILY
Qty: 90 TAB | Refills: 3 | Status: SHIPPED | OUTPATIENT
Start: 2020-12-30 | End: 2022-01-13 | Stop reason: SDUPTHER

## 2020-12-30 ASSESSMENT — FIBROSIS 4 INDEX: FIB4 SCORE: 0.21

## 2020-12-30 ASSESSMENT — PATIENT HEALTH QUESTIONNAIRE - PHQ9: CLINICAL INTERPRETATION OF PHQ2 SCORE: 0

## 2020-12-30 NOTE — PROGRESS NOTES
Chief Complaint   Patient presents with   • Hypertension   • Hyperlipidemia       Subjective:     HPI:   Zaire He presents today with the following: He is establishing care with me today.  His previous provider is no longer in practice.    1. Hyperlipemia, mixed  Patient denies chest pain, chest pressure, palpitations or exertional shortness of breath. Patient denies muscle aches or muscle weakness from the fenofibrate medication. Patient is a never smoker. Patient takes no aspirin daily. Patient has no history of myocardial infarction, stroke or PVD.  Follow-up lab orders discussed.    2. Essential hypertension  HTN - Chronic condition stable. Currently taking all meds as directed.   He is not taking baby aspirin daily.   He is not monitoring BP at home.  He was checking at the pharmacy monthly but since Covid that is no longer available.  His blood pressure today is excellent.  Denies cough or swelling from the lisinopril.  The medication is renewed.  Denies symptoms low BP: light-headed, tunnel-vision, unusual fatigue.   Denies symptoms high BP:pounding headache, visual changes, palpitations, flushed face.   Denies medicine side effects: unusual fatigue, slow heartbeat, foot/leg swelling, cough.    3.  IGT (impaired glucose tolerance)  Patient has history of very mild impaired glucose tolerance.  Since he was told that he has greatly reduced eating sweets and starches.  His current job he walks around almost all day long.  He has dropped 5 pounds more weight since last visit.  - HEMOGLOBIN A1C; Future      Patient Active Problem List    Diagnosis Date Noted   • Enlarged lymph node in neck 04/13/2020   • Essential hypertension 10/21/2019   • Hypertension 05/14/2019   • IGT (impaired glucose tolerance) 06/21/2016   • Hyperlipemia, mixed 09/30/2013       Current medicines (including changes today)  Current Outpatient Medications   Medication Sig Dispense Refill   • lisinopril (PRINIVIL) 20 MG Tab  "Take 1 Tab by mouth every day. 90 Tab 3   • fenofibrate (TRICOR) 145 MG Tab Take 1 Tab by mouth every day. 30 Tab 2     No current facility-administered medications for this visit.        No Known Allergies    ROS: As per HPI.  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       Objective:     /64   Pulse 75   Resp 18   Ht 1.626 m (5' 4\")   Wt 69.9 kg (154 lb)   SpO2 94%  Body mass index is 26.43 kg/m².    Physical Exam:  Constitutional: Well-developed and well-nourished. Not diaphoretic. No distress. Lucid and fluent.  Patient, physician and staff all wearing masks.  Skin: Skin is warm and dry. No rash noted.  Head: Atraumatic without lesions.  Eyes: Conjunctivae and extraocular motions are normal. Pupils are equal, round, and reactive to light. No scleral icterus.   Ears:  External ears unremarkable.   Neck: Supple, trachea midline. No thyromegaly present. No cervical or supraclavicular lymphadenopathy. No JVD or carotid bruits appreciated  Cardiovascular: Regular rate and rhythm.  Normal S1, S2 without murmur appreciated.  Chest: Effort normal. Clear to auscultation throughout. No adventitious sounds.   Abdomen: Soft, non tender, and without distention. Active bowel sounds in all four quadrants. No rebound, guarding, masses or hepatosplenomegaly.  Extremities: No cyanosis, clubbing, erythema, nor edema.   Neurological: Alert and oriented x 3. No tremor noted.  Tingling in his fingers right hand, sensation overall normal..  Psychiatric:  Behavior, mood, and affect are appropriate.       Assessment and Plan:     55 y.o. male with the following issues:    1. Hyperlipemia, mixed  Lipid Profile    Comp Metabolic Panel    CBC WITHOUT DIFFERENTIAL   2. Essential hypertension  Lipid Profile    Comp Metabolic Panel    CBC WITHOUT DIFFERENTIAL    lisinopril (PRINIVIL) 20 MG Tab   3. IGT (impaired glucose tolerance)  HEMOGLOBIN A1C         Followup: Return in about 6 months (around 6/30/2021), or if " symptoms worsen or fail to improve.

## 2020-12-31 ENCOUNTER — HOSPITAL ENCOUNTER (OUTPATIENT)
Dept: LAB | Facility: MEDICAL CENTER | Age: 55
End: 2020-12-31
Attending: FAMILY MEDICINE
Payer: COMMERCIAL

## 2020-12-31 DIAGNOSIS — E78.2 HYPERLIPEMIA, MIXED: ICD-10-CM

## 2020-12-31 DIAGNOSIS — I10 ESSENTIAL HYPERTENSION: ICD-10-CM

## 2020-12-31 DIAGNOSIS — R73.02 IGT (IMPAIRED GLUCOSE TOLERANCE): ICD-10-CM

## 2020-12-31 LAB
ALBUMIN SERPL BCP-MCNC: 3.8 G/DL (ref 3.2–4.9)
ALBUMIN/GLOB SERPL: 1.2 G/DL
ALP SERPL-CCNC: 69 U/L (ref 30–99)
ALT SERPL-CCNC: 27 U/L (ref 2–50)
ANION GAP SERPL CALC-SCNC: 5 MMOL/L (ref 7–16)
AST SERPL-CCNC: 17 U/L (ref 12–45)
BILIRUB SERPL-MCNC: 0.4 MG/DL (ref 0.1–1.5)
BUN SERPL-MCNC: 13 MG/DL (ref 8–22)
CALCIUM SERPL-MCNC: 9.1 MG/DL (ref 8.5–10.5)
CHLORIDE SERPL-SCNC: 106 MMOL/L (ref 96–112)
CHOLEST SERPL-MCNC: 174 MG/DL (ref 100–199)
CO2 SERPL-SCNC: 28 MMOL/L (ref 20–33)
CREAT SERPL-MCNC: 0.89 MG/DL (ref 0.5–1.4)
ERYTHROCYTE [DISTWIDTH] IN BLOOD BY AUTOMATED COUNT: 45.3 FL (ref 35.9–50)
EST. AVERAGE GLUCOSE BLD GHB EST-MCNC: 117 MG/DL
FASTING STATUS PATIENT QL REPORTED: NORMAL
GLOBULIN SER CALC-MCNC: 3.2 G/DL (ref 1.9–3.5)
GLUCOSE SERPL-MCNC: 95 MG/DL (ref 65–99)
HBA1C MFR BLD: 5.7 % (ref 0–5.6)
HCT VFR BLD AUTO: 44.7 % (ref 42–52)
HDLC SERPL-MCNC: 25 MG/DL
HGB BLD-MCNC: 14.2 G/DL (ref 14–18)
LDLC SERPL CALC-MCNC: 83 MG/DL
MCH RBC QN AUTO: 29.6 PG (ref 27–33)
MCHC RBC AUTO-ENTMCNC: 31.8 G/DL (ref 33.7–35.3)
MCV RBC AUTO: 93.3 FL (ref 81.4–97.8)
PLATELET # BLD AUTO: 332 K/UL (ref 164–446)
PMV BLD AUTO: 9.7 FL (ref 9–12.9)
POTASSIUM SERPL-SCNC: 4.2 MMOL/L (ref 3.6–5.5)
PROT SERPL-MCNC: 7 G/DL (ref 6–8.2)
RBC # BLD AUTO: 4.79 M/UL (ref 4.7–6.1)
SODIUM SERPL-SCNC: 139 MMOL/L (ref 135–145)
TRIGL SERPL-MCNC: 330 MG/DL (ref 0–149)
WBC # BLD AUTO: 6.4 K/UL (ref 4.8–10.8)

## 2020-12-31 PROCEDURE — 80053 COMPREHEN METABOLIC PANEL: CPT

## 2020-12-31 PROCEDURE — 80061 LIPID PANEL: CPT

## 2020-12-31 PROCEDURE — 36415 COLL VENOUS BLD VENIPUNCTURE: CPT

## 2020-12-31 PROCEDURE — 85027 COMPLETE CBC AUTOMATED: CPT

## 2020-12-31 PROCEDURE — 83036 HEMOGLOBIN GLYCOSYLATED A1C: CPT

## 2021-01-16 NOTE — DISCHARGE INSTRUCTIONS
ACTIVITY: Rest and take it easy for the first 24 hours.  A responsible adult is recommended to remain with you during that time.  It is normal to feel sleepy.  We encourage you to not do anything that requires balance, judgment or coordination.    MILD FLU-LIKE SYMPTOMS ARE NORMAL. YOU MAY EXPERIENCE GENERALIZED MUSCLE ACHES, THROAT IRRITATION, HEADACHE AND/OR SOME NAUSEA.    FOR 24 HOURS DO NOT:  Drive, operate machinery or run household appliances.  Drink beer or alcoholic beverages.   Make important decisions or sign legal documents.    SPECIAL INSTRUCTIONS:    Activity: no lifting weight greater than 20 lbs for 4 weeks   Diet: Clear liquids tonight and then advance as tolerated   Showers only for 2 weeks   No driving for one week or while on pain medications   Wound Care: remove tegaderm in 4 days       Discharge Instructions for Laparoscopic Cholecystectomy  You have had a procedure called a laparoscopic cholecystectomy. This is a surgery to remove your gallbladder. People who have this procedure often recover more quickly and have less pain than with open gallbladder surgery (called open cholecystectomy). Many surgeons advise a low-fat diet, staying away from fried food in particular, for the first month after surgery.     You can live a full and healthy life without your gallbladder. This includes eating the foods and doing the things you enjoyed before your gallbladder problems started.    Home care  Recommendations for home care include the following:     · Ask someone to drive you to your appointments for the next 3 days. Don’t drive until you are no longer taking pain medicine and can step on the brake pedal without hesitation.   · Wash the skin around your cut (incision) daily with mild soap and water. It's OK to shower the day after your surgery.  · Try to stay away from rich, greasy, or spicy food for a few days.  · Remember, it takes at least 1 week for you to get most of your strength and energy  back.  · If you are constipated, talk about a bowel regimen with your provider. Pain medicines can be constipating. Increased fiber and a stool softener are often helpful.  · Make an office visit to talk with your healthcare provider if these symptoms don’t go away in a week after your surgery:  · Extreme tiredness (fatigue)  · Pain around the incision  · Diarrhea or constipation  · Loss of appetite    When to call your healthcare provider  Call your healthcare provider right away if you have any of the following:    · Yellowing of your eyes or skin (jaundice)  · Chills  · Fever of 100.4°F (38.0°C) or higher, or as directed by your provider   · Redness, swelling, increasing pain, pus, or a bad smell at the incision site  · Dark or rust-colored urine  · Stool that is gunnar-colored or light in color instead of brown  · Increasing belly pain  · Rectal bleeding  · Trouble breathing or shortness of breath  · Leg swelling    DIET: To avoid nausea, slowly advance diet as tolerated, avoiding spicy or greasy foods for the first day.  Add more substantial food to your diet according to your physician's instructions.  Babies can be fed formula or breast milk as soon as they are hungry.  INCREASE FLUIDS AND FIBER TO AVOID CONSTIPATION.    SURGICAL DRESSING/BATHING: Do not submerge incisions in water until cleared by your doctor.    FOLLOW-UP APPOINTMENT:  A follow-up appointment should be arranged with your doctor in 7-10 days; call to schedule.    You should CALL YOUR PHYSICIAN if you develop:  Fever greater than 101 degrees F.  Pain not relieved by medication, or persistent nausea or vomiting.  Excessive bleeding (blood soaking through dressing) or unexpected drainage from the wound.  Extreme redness or swelling around the incision site, drainage of pus or foul smelling drainage.  Inability to urinate or empty your bladder within 8 hours.  Problems with breathing or chest pain.    You should call 911 if you develop problems  with breathing or chest pain.  If you are unable to contact your doctor or surgical center, you should go to the nearest emergency room or urgent care center.  Physician's telephone #: Dr. Young [phone]    If any questions arise, call your doctor.  If your doctor is not available, please feel free to call the Surgical Center at [phone].  The Center is open Monday through Friday from 7AM to 7PM.  You can also call the HEALTH HOTLINE open 24 hours/day, 7 days/week and speak to a nurse at [phone], or toll free at [phone].    A registered nurse may call you a few days after your surgery to see how you are doing after your procedure.    MEDICATIONS: Resume taking daily medication.  Take prescribed pain medication with food.  If no medication is prescribed, you may take non-aspirin pain medication if needed.  PAIN MEDICATION CAN BE VERY CONSTIPATING.  Take a stool softener or laxative such as senokot, pericolace, or milk of magnesia if needed.    Prescription given for Percocet, Zofran.  Last pain medication given at **3:43 PM*.    If your physician has prescribed pain medication that includes Acetaminophen (Tylenol), do not take additional Acetaminophen (Tylenol) while taking the prescribed medication.    Depression / Suicide Risk    As you are discharged from this [facility] facility, it is important to learn how to keep safe from harming yourself.    Recognize the warning signs:  · Abrupt changes in personality, positive or negative- including increase in energy   · Giving away possessions  · Change in eating patterns- significant weight changes-  positive or negative  · Change in sleeping patterns- unable to sleep or sleeping all the time   · Unwillingness or inability to communicate  · Depression  · Unusual sadness, discouragement and loneliness  · Talk of wanting to die  · Neglect of personal appearance   · Rebelliousness- reckless behavior  · Withdrawal from people/activities they  love  · Confusion- inability to concentrate     If you or a loved one observes any of these behaviors or has concerns about self-harm, here's what you can do:  · Talk about it- your feelings and reasons for harming yourself  · Remove any means that you might use to hurt yourself (examples: pills, rope, extension cords, firearm)  · Get professional help from the community (Mental Health, Substance Abuse, psychological counseling)  · Do not be alone:Call your Safe Contact- someone whom you trust who will be there for you.  · Call your local CRISIS HOTLINE 640-3284 or 369-420-3522  · Call your local Children's Mobile Crisis Response Team [place] (395) 791-1375 or www.[employer]  · Call the toll free National Suicide Prevention Hotlines   · National Suicide Prevention Lifeline 375-112-FGLF (7329)  · National Hope Line Network 800-SUICIDE (903-0972)   177.8

## 2021-03-01 DIAGNOSIS — E78.2 HYPERLIPEMIA, MIXED: ICD-10-CM

## 2021-03-01 RX ORDER — FENOFIBRATE 145 MG/1
TABLET, COATED ORAL
Qty: 90 TABLET | Refills: 1 | Status: SHIPPED | OUTPATIENT
Start: 2021-03-01 | End: 2022-01-13 | Stop reason: SDUPTHER

## 2021-07-12 ENCOUNTER — OFFICE VISIT (OUTPATIENT)
Dept: URGENT CARE | Facility: PHYSICIAN GROUP | Age: 56
End: 2021-07-12
Payer: COMMERCIAL

## 2021-07-12 VITALS
WEIGHT: 143 LBS | BODY MASS INDEX: 24.41 KG/M2 | SYSTOLIC BLOOD PRESSURE: 132 MMHG | HEART RATE: 79 BPM | TEMPERATURE: 98.5 F | DIASTOLIC BLOOD PRESSURE: 64 MMHG | OXYGEN SATURATION: 96 % | RESPIRATION RATE: 16 BRPM | HEIGHT: 64 IN

## 2021-07-12 DIAGNOSIS — H00.024 HORDEOLUM INTERNUM OF LEFT UPPER EYELID: ICD-10-CM

## 2021-07-12 DIAGNOSIS — H01.004 BLEPHARITIS OF LEFT UPPER EYELID, UNSPECIFIED TYPE: ICD-10-CM

## 2021-07-12 PROCEDURE — 99213 OFFICE O/P EST LOW 20 MIN: CPT | Performed by: NURSE PRACTITIONER

## 2021-07-12 RX ORDER — TOBRAMYCIN 3 MG/ML
2 SOLUTION/ DROPS OPHTHALMIC EVERY 4 HOURS
Qty: 5 ML | Refills: 0 | Status: SHIPPED | OUTPATIENT
Start: 2021-07-12 | End: 2021-07-19

## 2021-07-12 ASSESSMENT — FIBROSIS 4 INDEX: FIB4 SCORE: 0.52

## 2021-07-12 ASSESSMENT — ENCOUNTER SYMPTOMS
BLURRED VISION: 0
EYE REDNESS: 1
EYE ITCHING: 1
EYE DISCHARGE: 0
EYE PAIN: 1
CONSTITUTIONAL NEGATIVE: 1
FEVER: 0

## 2021-07-12 ASSESSMENT — VISUAL ACUITY: OU: 1

## 2021-07-12 NOTE — PROGRESS NOTES
Subjective:     Zaire He is a 55 y.o. male who presents for Eye Problem (left eye swollen, red and itchy x 2 days)       Eye Problem   The left eye is affected. This is a new problem. Episode onset: 2 days. The problem has been gradually worsening. There was no injury mechanism. Associated symptoms include eye redness and itching. Pertinent negatives include no blurred vision, eye discharge or fever. Associated symptoms comments: Tenderness, itching, warmth, redness, swelling of left upper eyelid. Treatments tried: Warm compress. The treatment provided no relief.     Patient was screened prior to rooming and denied COVID-19 diagnosis or contact with a person who has been diagnosed or is suspected to have COVID-19. During this visit, appropriate PPE was worn, hand hygiene was performed, and the patient and any visitors were masked.     PMH:  has a past medical history of High cholesterol (06/18/2020), Hypercholesteremia, and Hypertension (5/14/2019). He also has no past medical history of Arrhythmia, Asthma, Blood transfusion without reported diagnosis, Cancer (HCC), CHF (congestive heart failure) (HCC), Clotting disorder (HCC), COPD (chronic obstructive pulmonary disease) (HCC), Diabetes (HCC), Diabetic neuropathy (HCC), GERD (gastroesophageal reflux disease), Goiter, Heart attack (HCC), IBD (inflammatory bowel disease), Migraine, Muscle disorder, Osteoporosis, Parathyroid disorder (HCC), Pituitary disease (HCC), Pulmonary emphysema (HCC), Seizure (HCC), Stroke (HCC), Tuberculosis, or Urinary bladder disorder.    MEDS:   Current Outpatient Medications:   •  tobramycin (TOBREX) 0.3 % Solution ophthalmic solution, Administer 2 Drops into the left eye every 4 hours for 7 days., Disp: 5 mL, Rfl: 0  •  fenofibrate (TRICOR) 145 MG Tab, TAKE 1 TABLET BY MOUTH EVERY DAY, Disp: 90 tablet, Rfl: 1  •  lisinopril (PRINIVIL) 20 MG Tab, Take 1 Tab by mouth every day., Disp: 90 Tab, Rfl: 3    ALLERGIES: No Known  "Allergies    SURGHX:   Past Surgical History:   Procedure Laterality Date   • CITLALLI BY LAPAROSCOPY  6/19/2020    Procedure: CHOLECYSTECTOMY, LAPAROSCOPIC;  Surgeon: Jose Young M.D.;  Location: SURGERY [facility];  Service: General     SOCHX:  reports that he has never smoked. He has never used smokeless tobacco. He reports that he does not drink alcohol and does not use drugs.     FH: Reviewed with patient, not pertinent to this visit.    Review of Systems   Constitutional: Negative.  Negative for fever and malaise/fatigue.   Eyes: Positive for pain, redness and itching. Negative for blurred vision and discharge.   All other systems reviewed and are negative.    Additional details per HPI.      Objective:     /64 (BP Location: Left arm, Patient Position: Sitting, BP Cuff Size: Adult)   Pulse 79   Temp 36.9 °C (98.5 °F) (Temporal)   Resp 16   Ht 1.626 m (5' 4\")   Wt 64.9 kg (143 lb)   SpO2 96%   BMI 24.55 kg/m²     Physical Exam  Vitals reviewed.   Constitutional:       General: He is not in acute distress.     Appearance: He is well-developed. He is not ill-appearing or toxic-appearing.   HENT:      Head: Normocephalic.      Right Ear: External ear normal.      Left Ear: External ear normal.   Eyes:      General: Vision grossly intact.         Right eye: No discharge.         Left eye: Hordeolum (Left upper eyelid medially) present.No discharge.      Extraocular Movements: Extraocular movements intact.      Conjunctiva/sclera:      Right eye: Right conjunctiva is not injected. No chemosis.     Left eye: Left conjunctiva is injected (Medially). No chemosis.     Pupils: Pupils are equal, round, and reactive to light.      Comments: Erythema, swelling, tenderness, warmth of left upper eyelid, worse medially   Cardiovascular:      Rate and Rhythm: Normal rate.   Pulmonary:      Effort: Pulmonary effort is normal. No respiratory distress.   Musculoskeletal:         General: No deformity. Normal " range of motion.      Cervical back: Normal range of motion.   Skin:     General: Skin is warm and dry.      Coloration: Skin is not pale.   Neurological:      Mental Status: He is alert and oriented to person, place, and time.      Sensory: No sensory deficit.      Motor: No weakness.      Coordination: Coordination normal.   Psychiatric:         Behavior: Behavior normal. Behavior is cooperative.       Assessment/Plan:     1. Blepharitis of left upper eyelid, unspecified type  - tobramycin (TOBREX) 0.3 % Solution ophthalmic solution; Administer 2 Drops into the left eye every 4 hours for 7 days.  Dispense: 5 mL; Refill: 0    2. Hordeolum internum of left upper eyelid  - tobramycin (TOBREX) 0.3 % Solution ophthalmic solution; Administer 2 Drops into the left eye every 4 hours for 7 days.  Dispense: 5 mL; Refill: 0    Rx as above sent electronically. Continue warm compresses. OTC ibuprofen.    Differential diagnosis, natural history, supportive care, over-the-counter symptom management per 's instructions, close monitoring, and indications for immediate follow-up discussed.     Patient advised to: Return for 1) Symptoms that worsen/don't improve, or go to ER, 2) Follow up with primary care in 7-10 days.    All questions answered. Patient agrees with the plan of care.    Discharge summary provided.

## 2021-07-12 NOTE — PATIENT INSTRUCTIONS
Blepharitis  Blepharitis is inflammation of the eyelids. Blepharitis may happen with:  · Reddish, scaly skin around the scalp and eyebrows.  · Burning or itching of the eyelids.  · Eye discharge at night that causes the eyelashes to stick together in the morning.  · Eyelashes that fall out.  · Sensitivity to light.  Follow these instructions at home:  Pay attention to any changes in how your eyes look or feel. Tell your health care provider about any changes. Follow these instructions to help with your condition.  Keeping Clean    · Wash your hands often.  · Wash your eyelids with warm water or with warm water that is mixed with a small amount of baby shampoo. Do this two times per day or as often as needed.  · Wash your face and eyebrows at least once a day.  · Use a clean towel each time you dry your eyelids. Do not use this towel to clean or dry other areas of your body. Do not share your towel with anyone.  General instructions  · Avoid wearing makeup until you get better. Do not share makeup with anyone.  · Avoid rubbing your eyes.  · Apply warm compresses to your eyes 2 times per day for 10 minutes at a time, or as told by your health care provider.  · If you were prescribed an antibiotic ointment or steroid drops, apply or use the medicine as told by your health care provider. Do not stop using the medicine even if you feel better.  · Keep all follow-up visits as told by your health care provider. This is important.  Contact a health care provider if:  · Your eyelids feel hot.  · You have blisters or a rash on your eyelids.  · The condition does not go away in 2-4 days.  · The inflammation gets worse.  Get help right away if:  · You have pain or redness that gets worse or spreads to other parts of your face.  · Your vision changes.  · You have pain when looking at lights or moving objects.  · You have a fever.  Summary  · Blepharitis is inflammation of the eyelids.  · Pay attention to any changes in how your  eyes look or feel. Tell your health care provider about any changes.  · Follow home care instructions as told by your health care provider. Wash your hands often. Avoid wearing makeup. Do not rub your eyes.  · To treat this condition, use warm compresses and prescription ointments or eye drops.  · Let your health care provider know if you have vision changes, blisters or rash on eyelids, pain that spreads to your face, or warmth on your eyelids.  This information is not intended to replace advice given to you by your health care provider. Make sure you discuss any questions you have with your health care provider.  Document Released: 12/15/2001 Document Revised: 06/10/2019 Document Reviewed: 06/10/2019  [employer] Patient Education © 2020 [employer]        Stye    A stye, also known as a hordeolum, is a bump that forms on an eyelid. It may look like a pimple next to the eyelash. A stye can form inside the eyelid (internal stye) or outside the eyelid (external stye). A stye can cause redness, swelling, and pain on the eyelid.  Styes are very common. Anyone can get them at any age. They usually occur in just one eye, but you may have more than one in either eye.  What are the causes?  A stye is caused by an infection. The infection is almost always caused by bacteria called Staphylococcus aureus. This is a common type of bacteria that lives on the skin.  An internal stye may result from an infected oil-producing gland inside the eyelid. An external stye may be caused by an infection at the base of the eyelash (hair follicle).  What increases the risk?  You are more likely to develop a stye if:  · You have had a stye before.  · You have any of these conditions:  ? Diabetes.  ? Red, itchy, inflamed eyelids (blepharitis).  ? A skin condition such as seborrheic dermatitis or rosacea.  ? High fat levels in your blood (lipids).  What are the signs or symptoms?  The most common symptom of a stye is eyelid pain. Internal  styes are more painful than external styes. Other symptoms may include:  · Painful swelling of your eyelid.  · A scratchy feeling in your eye.  · Tearing and redness of your eye.  · Pus draining from the stye.  How is this diagnosed?  Your health care provider may be able to diagnose a stye just by examining your eye. The health care provider may also check to make sure:  · You do not have a fever or other signs of a more serious infection.  · The infection has not spread to other parts of your eye or areas around your eye.  How is this treated?  Most styes will clear up in a few days without treatment or with warm compresses applied to the area. You may need to use antibiotic drops or ointment to treat an infection.  In some cases, if your stye does not heal with routine treatment, your health care provider may drain pus from the stye using a thin blade or needle. This may be done if the stye is large, causing a lot of pain, or affecting your vision.  Follow these instructions at home:  · Take over-the-counter and prescription medicines only as told by your health care provider. This includes eye drops or ointments.  · If you were prescribed an antibiotic medicine, apply or use it as told by your health care provider. Do not stop using the antibiotic even if your condition improves.  · Apply a warm, wet cloth (warm compress) to your eye for 5-10 minutes, 4 times a day.  · Clean the affected eyelid as directed by your health care provider.  · Do not wear contact lenses or eye makeup until your stye has healed.  · Do not try to pop or drain the stye.  · Do not rub your eye.  Contact a health care provider if:  · You have chills or a fever.  · Your stye does not go away after several days.  · Your stye affects your vision.  · Your eyeball becomes swollen, red, or painful.  Get help right away if:  · You have pain when moving your eye around.  Summary  · A stye is a bump that forms on an eyelid. It may look like a  pimple next to the eyelash.  · A stye can form inside the eyelid (internal stye) or outside the eyelid (external stye). A stye can cause redness, swelling, and pain on the eyelid.  · Your health care provider may be able to diagnose a stye just by examining your eye.  · Apply a warm, wet cloth (warm compress) to your eye for 5-10 minutes, 4 times a day.  This information is not intended to replace advice given to you by your health care provider. Make sure you discuss any questions you have with your health care provider.  Document Released: 09/27/2006 Document Revised: 11/30/2018 Document Reviewed: 08/30/2018  [employer] Patient Education © 2020 [employer]

## 2022-01-13 DIAGNOSIS — E78.2 HYPERLIPEMIA, MIXED: ICD-10-CM

## 2022-01-13 RX ORDER — FENOFIBRATE 145 MG/1
145 TABLET, COATED ORAL
Qty: 90 TABLET | Refills: 1 | Status: SHIPPED | OUTPATIENT
Start: 2022-01-13 | End: 2022-06-14

## 2022-04-26 DIAGNOSIS — I10 ESSENTIAL HYPERTENSION: ICD-10-CM

## 2022-04-26 RX ORDER — LISINOPRIL 20 MG/1
20 TABLET ORAL DAILY
Qty: 30 TABLET | Refills: 0 | Status: SHIPPED | OUTPATIENT
Start: 2022-04-26 | End: 2022-05-25

## 2022-05-25 DIAGNOSIS — I10 ESSENTIAL HYPERTENSION: ICD-10-CM

## 2022-05-25 RX ORDER — LISINOPRIL 20 MG/1
20 TABLET ORAL DAILY
Qty: 30 TABLET | Refills: 0 | Status: SHIPPED | OUTPATIENT
Start: 2022-05-25 | End: 2022-07-05

## 2022-05-27 ENCOUNTER — TELEPHONE (OUTPATIENT)
Dept: HEALTH INFORMATION MANAGEMENT | Facility: OTHER | Age: 57
End: 2022-05-27
Payer: COMMERCIAL

## 2022-06-11 SDOH — ECONOMIC STABILITY: TRANSPORTATION INSECURITY
IN THE PAST 12 MONTHS, HAS LACK OF TRANSPORTATION KEPT YOU FROM MEETINGS, WORK, OR FROM GETTING THINGS NEEDED FOR DAILY LIVING?: PATIENT DECLINED

## 2022-06-11 SDOH — HEALTH STABILITY: PHYSICAL HEALTH

## 2022-06-11 SDOH — ECONOMIC STABILITY: INCOME INSECURITY: IN THE LAST 12 MONTHS, WAS THERE A TIME WHEN YOU WERE NOT ABLE TO PAY THE MORTGAGE OR RENT ON TIME?: PATIENT REFUSED

## 2022-06-11 SDOH — ECONOMIC STABILITY: FOOD INSECURITY: WITHIN THE PAST 12 MONTHS, YOU WORRIED THAT YOUR FOOD WOULD RUN OUT BEFORE YOU GOT MONEY TO BUY MORE.: PATIENT DECLINED

## 2022-06-11 SDOH — ECONOMIC STABILITY: TRANSPORTATION INSECURITY
IN THE PAST 12 MONTHS, HAS THE LACK OF TRANSPORTATION KEPT YOU FROM MEDICAL APPOINTMENTS OR FROM GETTING MEDICATIONS?: PATIENT DECLINED

## 2022-06-11 SDOH — ECONOMIC STABILITY: HOUSING INSECURITY
IN THE LAST 12 MONTHS, WAS THERE A TIME WHEN YOU DID NOT HAVE A STEADY PLACE TO SLEEP OR SLEPT IN A SHELTER (INCLUDING NOW)?: PATIENT REFUSED

## 2022-06-11 SDOH — ECONOMIC STABILITY: HOUSING INSECURITY

## 2022-06-11 SDOH — HEALTH STABILITY: MENTAL HEALTH

## 2022-06-11 SDOH — ECONOMIC STABILITY: TRANSPORTATION INSECURITY
IN THE PAST 12 MONTHS, HAS LACK OF RELIABLE TRANSPORTATION KEPT YOU FROM MEDICAL APPOINTMENTS, MEETINGS, WORK OR FROM GETTING THINGS NEEDED FOR DAILY LIVING?: PATIENT DECLINED

## 2022-06-11 SDOH — ECONOMIC STABILITY: INCOME INSECURITY: HOW HARD IS IT FOR YOU TO PAY FOR THE VERY BASICS LIKE FOOD, HOUSING, MEDICAL CARE, AND HEATING?: PATIENT DECLINED

## 2022-06-11 SDOH — ECONOMIC STABILITY: FOOD INSECURITY: WITHIN THE PAST 12 MONTHS, THE FOOD YOU BOUGHT JUST DIDN'T LAST AND YOU DIDN'T HAVE MONEY TO GET MORE.: PATIENT DECLINED

## 2022-06-11 ASSESSMENT — LIFESTYLE VARIABLES
HOW MANY STANDARD DRINKS CONTAINING ALCOHOL DO YOU HAVE ON A TYPICAL DAY: PATIENT DECLINED
HOW OFTEN DO YOU HAVE SIX OR MORE DRINKS ON ONE OCCASION: NEVER
AUDIT-C TOTAL SCORE: -1
HOW OFTEN DO YOU HAVE A DRINK CONTAINING ALCOHOL: PATIENT DECLINED
SKIP TO QUESTIONS 9-10: 0

## 2022-06-11 ASSESSMENT — SOCIAL DETERMINANTS OF HEALTH (SDOH)
WITHIN THE PAST 12 MONTHS, YOU WORRIED THAT YOUR FOOD WOULD RUN OUT BEFORE YOU GOT THE MONEY TO BUY MORE: PATIENT DECLINED
IN A TYPICAL WEEK, HOW MANY TIMES DO YOU TALK ON THE PHONE WITH FAMILY, FRIENDS, OR NEIGHBORS?: ONCE A WEEK
DO YOU BELONG TO ANY CLUBS OR ORGANIZATIONS SUCH AS CHURCH GROUPS UNIONS, FRATERNAL OR ATHLETIC GROUPS, OR SCHOOL GROUPS?: NO
HOW OFTEN DO YOU HAVE SIX OR MORE DRINKS ON ONE OCCASION: NEVER
DO YOU BELONG TO ANY CLUBS OR ORGANIZATIONS SUCH AS CHURCH GROUPS UNIONS, FRATERNAL OR ATHLETIC GROUPS, OR SCHOOL GROUPS?: NO
HOW OFTEN DO YOU ATTENT MEETINGS OF THE CLUB OR ORGANIZATION YOU BELONG TO?: NEVER
IN A TYPICAL WEEK, HOW MANY TIMES DO YOU TALK ON THE PHONE WITH FAMILY, FRIENDS, OR NEIGHBORS?: ONCE A WEEK
HOW OFTEN DO YOU HAVE A DRINK CONTAINING ALCOHOL: PATIENT DECLINED
HOW HARD IS IT FOR YOU TO PAY FOR THE VERY BASICS LIKE FOOD, HOUSING, MEDICAL CARE, AND HEATING?: PATIENT DECLINED
HOW OFTEN DO YOU ATTENT MEETINGS OF THE CLUB OR ORGANIZATION YOU BELONG TO?: NEVER
HOW MANY DRINKS CONTAINING ALCOHOL DO YOU HAVE ON A TYPICAL DAY WHEN YOU ARE DRINKING: PATIENT DECLINED

## 2022-06-14 ENCOUNTER — OFFICE VISIT (OUTPATIENT)
Dept: MEDICAL GROUP | Facility: PHYSICIAN GROUP | Age: 57
End: 2022-06-14
Payer: COMMERCIAL

## 2022-06-14 VITALS
RESPIRATION RATE: 16 BRPM | BODY MASS INDEX: 27.31 KG/M2 | SYSTOLIC BLOOD PRESSURE: 138 MMHG | TEMPERATURE: 97.7 F | OXYGEN SATURATION: 99 % | WEIGHT: 160 LBS | HEIGHT: 64 IN | DIASTOLIC BLOOD PRESSURE: 78 MMHG | HEART RATE: 58 BPM

## 2022-06-14 DIAGNOSIS — Z80.0 FAMILY HISTORY OF COLON CANCER: ICD-10-CM

## 2022-06-14 DIAGNOSIS — I10 ESSENTIAL HYPERTENSION: Chronic | ICD-10-CM

## 2022-06-14 DIAGNOSIS — E78.5 DYSLIPIDEMIA: ICD-10-CM

## 2022-06-14 DIAGNOSIS — Z00.00 WELL ADULT EXAM: ICD-10-CM

## 2022-06-14 DIAGNOSIS — R73.03 PREDIABETES: Chronic | ICD-10-CM

## 2022-06-14 PROCEDURE — 99214 OFFICE O/P EST MOD 30 MIN: CPT | Performed by: INTERNAL MEDICINE

## 2022-06-14 ASSESSMENT — PATIENT HEALTH QUESTIONNAIRE - PHQ9: CLINICAL INTERPRETATION OF PHQ2 SCORE: 0

## 2022-06-14 ASSESSMENT — FIBROSIS 4 INDEX: FIB4 SCORE: 0.53

## 2022-06-14 NOTE — ASSESSMENT & PLAN NOTE
This is a chronic condition.  Patient takes lisinopril daily.  Blood pressure has been well controlled.  No significant side effects reported.

## 2022-06-14 NOTE — ASSESSMENT & PLAN NOTE
Patient reported that he has not been taking Tricor for several weeks.  The patient would like to get the blood work done for follow-up.

## 2022-06-14 NOTE — PROGRESS NOTES
"PRIMARY CARE CLINIC VISIT  Chief Complaint   Patient presents with   • New Patient   Establish care  Requests lab test  Blood pressure check      History of Present Illness     Dyslipidemia  Patient reported that he has not been taking Tricor for several weeks.  The patient would like to get the blood work done for follow-up.    Essential hypertension  This is a chronic condition.  Patient takes lisinopril daily.  Blood pressure has been well controlled.  No significant side effects reported.    Family history of colon cancer  Patient reported that his mother was diagnosed with colon cancer.  I strongly recommend colonoscopy to be done.  The patient however refused.  The patient would like to do Cologuard.    Prediabetes  Patient on diet therapy.  Lab tests ordered.      Current Outpatient Medications on File Prior to Visit   Medication Sig Dispense Refill   • lisinopril (PRINIVIL) 20 MG Tab TAKE 1 TABLET BY MOUTH EVERY DAY 30 Tablet 0     No current facility-administered medications on file prior to visit.        Allergies: Patient has no known allergies.    ROS  As per HPI above. All other systems reviewed and negative.      Past Medical, Social, and Family history reviewed and updated in EPIC     Objective     /78 (BP Location: Left arm, Patient Position: Sitting, BP Cuff Size: Adult)   Pulse (!) 58   Temp 36.5 °C (97.7 °F) (Temporal)   Resp 16   Ht 1.626 m (5' 4\")   Wt 72.6 kg (160 lb)   SpO2 99%    Body mass index is 27.46 kg/m².    General: alert and oriented  Cardiovascular: regular rate and rhythm  Pulmonary: lungs : no wheezing   Gastrointestinal: BS present. No obvious mass noted   on rectal examination patient declined        Assessment and Plan     1. Dyslipidemia  Patient presently on diet therapy.  Lab tests ordered for follow-up.  2. Family history of colon cancer  - COLOGUARD (FIT DNA)  Strongly recommend colonoscopy to be done.  The patient refused.  Patient would like to do Cologuard " instead.      3. Well adult exam  Routine lab work ordered.  Advised the patient to follow-up after lab test done.    - HEMOGLOBIN A1C; Future  - ALANINE AMINO-TRANS; Future  - Basic Metabolic Panel; Future  - CBC WITHOUT DIFFERENTIAL; Future  - Lipid Profile; Future  - PROSTATE SPECIFIC AG SCREENING; Future  - MICROALBUMIN CREAT RATIO URINE; Future    4. Essential hypertension  Stable continue with lisinopril  5. Prediabetes  Recommend diet and exercise.  Lab tests ordered.                      Recommended Tdap but the patient refused.  Also recommend the patient to consider COVID booster vaccine.         Please note that this dictation was created using voice recognition software. I have made every reasonable attempt to correct obvious errors but there may be errors of grammar and content that I may have overlooked prior to finalization of this note.      Az Barry MD  Internal Medicine  [place] [facility]

## 2022-06-14 NOTE — ASSESSMENT & PLAN NOTE
Patient reported that his mother was diagnosed with colon cancer.  I strongly recommend colonoscopy to be done.  The patient however refused.  The patient would like to do Cologuard.

## 2022-06-15 ENCOUNTER — HOSPITAL ENCOUNTER (OUTPATIENT)
Dept: LAB | Facility: MEDICAL CENTER | Age: 57
End: 2022-06-15
Attending: INTERNAL MEDICINE
Payer: COMMERCIAL

## 2022-06-15 DIAGNOSIS — Z00.00 WELL ADULT EXAM: ICD-10-CM

## 2022-06-15 LAB
ALT SERPL-CCNC: 25 U/L (ref 2–50)
ANION GAP SERPL CALC-SCNC: 8 MMOL/L (ref 7–16)
BUN SERPL-MCNC: 14 MG/DL (ref 8–22)
CALCIUM SERPL-MCNC: 9 MG/DL (ref 8.5–10.5)
CHLORIDE SERPL-SCNC: 107 MMOL/L (ref 96–112)
CHOLEST SERPL-MCNC: 199 MG/DL (ref 100–199)
CO2 SERPL-SCNC: 25 MMOL/L (ref 20–33)
CREAT SERPL-MCNC: 0.87 MG/DL (ref 0.5–1.4)
CREAT UR-MCNC: 216.46 MG/DL
ERYTHROCYTE [DISTWIDTH] IN BLOOD BY AUTOMATED COUNT: 40.7 FL (ref 35.9–50)
EST. AVERAGE GLUCOSE BLD GHB EST-MCNC: 111 MG/DL
FASTING STATUS PATIENT QL REPORTED: NORMAL
GFR SERPLBLD CREATININE-BSD FMLA CKD-EPI: 102 ML/MIN/1.73 M 2
GLUCOSE SERPL-MCNC: 112 MG/DL (ref 65–99)
HBA1C MFR BLD: 5.5 % (ref 4–5.6)
HCT VFR BLD AUTO: 47.8 % (ref 42–52)
HDLC SERPL-MCNC: 25 MG/DL
HGB BLD-MCNC: 16.2 G/DL (ref 14–18)
LDLC SERPL CALC-MCNC: 95 MG/DL
MCH RBC QN AUTO: 30.1 PG (ref 27–33)
MCHC RBC AUTO-ENTMCNC: 33.9 G/DL (ref 33.7–35.3)
MCV RBC AUTO: 88.8 FL (ref 81.4–97.8)
MICROALBUMIN UR-MCNC: <1.2 MG/DL
MICROALBUMIN/CREAT UR: NORMAL MG/G (ref 0–30)
PLATELET # BLD AUTO: 273 K/UL (ref 164–446)
PMV BLD AUTO: 9.7 FL (ref 9–12.9)
POTASSIUM SERPL-SCNC: 4.4 MMOL/L (ref 3.6–5.5)
PSA SERPL-MCNC: 0.41 NG/ML (ref 0–4)
RBC # BLD AUTO: 5.38 M/UL (ref 4.7–6.1)
SODIUM SERPL-SCNC: 140 MMOL/L (ref 135–145)
TRIGL SERPL-MCNC: 396 MG/DL (ref 0–149)
WBC # BLD AUTO: 6.7 K/UL (ref 4.8–10.8)

## 2022-06-15 PROCEDURE — 80061 LIPID PANEL: CPT

## 2022-06-15 PROCEDURE — 85027 COMPLETE CBC AUTOMATED: CPT

## 2022-06-15 PROCEDURE — 83036 HEMOGLOBIN GLYCOSYLATED A1C: CPT

## 2022-06-15 PROCEDURE — 84153 ASSAY OF PSA TOTAL: CPT

## 2022-06-15 PROCEDURE — 82043 UR ALBUMIN QUANTITATIVE: CPT

## 2022-06-15 PROCEDURE — 36415 COLL VENOUS BLD VENIPUNCTURE: CPT

## 2022-06-15 PROCEDURE — 84460 ALANINE AMINO (ALT) (SGPT): CPT

## 2022-06-15 PROCEDURE — 80048 BASIC METABOLIC PNL TOTAL CA: CPT

## 2022-06-15 PROCEDURE — 82570 ASSAY OF URINE CREATININE: CPT

## 2022-06-15 RX ORDER — FENOFIBRATE 145 MG/1
145 TABLET, COATED ORAL DAILY
Qty: 90 TABLET | Refills: 3 | Status: SHIPPED | OUTPATIENT
Start: 2022-06-15 | End: 2023-07-17

## 2022-07-02 DIAGNOSIS — I10 ESSENTIAL HYPERTENSION: ICD-10-CM

## 2022-07-05 RX ORDER — LISINOPRIL 20 MG/1
20 TABLET ORAL DAILY
Qty: 30 TABLET | Refills: 0 | Status: SHIPPED | OUTPATIENT
Start: 2022-07-05 | End: 2022-08-02

## 2022-08-02 DIAGNOSIS — I10 ESSENTIAL HYPERTENSION: ICD-10-CM

## 2022-08-02 RX ORDER — LISINOPRIL 20 MG/1
20 TABLET ORAL DAILY
Qty: 30 TABLET | Refills: 0 | Status: SHIPPED | OUTPATIENT
Start: 2022-08-02 | End: 2023-01-13 | Stop reason: SDUPTHER

## 2023-01-13 ENCOUNTER — PATIENT MESSAGE (OUTPATIENT)
Dept: MEDICAL GROUP | Facility: PHYSICIAN GROUP | Age: 58
End: 2023-01-13
Payer: COMMERCIAL

## 2023-01-13 DIAGNOSIS — I10 ESSENTIAL HYPERTENSION: ICD-10-CM

## 2023-01-15 RX ORDER — LISINOPRIL 20 MG/1
20 TABLET ORAL DAILY
Qty: 90 TABLET | Refills: 0 | Status: SHIPPED | OUTPATIENT
Start: 2023-01-15 | End: 2023-05-08

## 2023-02-11 ENCOUNTER — OFFICE VISIT (OUTPATIENT)
Dept: URGENT CARE | Facility: PHYSICIAN GROUP | Age: 58
End: 2023-02-11
Payer: COMMERCIAL

## 2023-02-11 VITALS
TEMPERATURE: 99.4 F | DIASTOLIC BLOOD PRESSURE: 76 MMHG | WEIGHT: 160 LBS | BODY MASS INDEX: 27.31 KG/M2 | HEART RATE: 79 BPM | HEIGHT: 64 IN | RESPIRATION RATE: 16 BRPM | SYSTOLIC BLOOD PRESSURE: 128 MMHG | OXYGEN SATURATION: 96 %

## 2023-02-11 DIAGNOSIS — J06.9 VIRAL URI WITH COUGH: ICD-10-CM

## 2023-02-11 LAB
FLUAV RNA SPEC QL NAA+PROBE: NEGATIVE
FLUBV RNA SPEC QL NAA+PROBE: NEGATIVE
RSV RNA SPEC QL NAA+PROBE: NEGATIVE
S PYO DNA SPEC NAA+PROBE: NOT DETECTED
SARS-COV-2 RNA RESP QL NAA+PROBE: NOT DETECTED

## 2023-02-11 PROCEDURE — 99213 OFFICE O/P EST LOW 20 MIN: CPT

## 2023-02-11 PROCEDURE — 87651 STREP A DNA AMP PROBE: CPT

## 2023-02-11 PROCEDURE — 0241U POCT CEPHEID COV-2, FLU A/B, RSV - PCR: CPT

## 2023-02-11 RX ORDER — BENZONATATE 100 MG/1
100 CAPSULE ORAL 3 TIMES DAILY PRN
Qty: 30 CAPSULE | Refills: 0 | Status: SHIPPED | OUTPATIENT
Start: 2023-02-11 | End: 2023-05-31

## 2023-02-11 RX ORDER — DEXTROMETHORPHAN HYDROBROMIDE AND PROMETHAZINE HYDROCHLORIDE 15; 6.25 MG/5ML; MG/5ML
5 SYRUP ORAL NIGHTLY PRN
Qty: 50 ML | Refills: 0 | Status: SHIPPED | OUTPATIENT
Start: 2023-02-11 | End: 2023-05-31

## 2023-02-11 ASSESSMENT — ENCOUNTER SYMPTOMS
SHORTNESS OF BREATH: 0
STRIDOR: 0
COUGH: 1
SPUTUM PRODUCTION: 0
SINUS PAIN: 0
CHILLS: 0
WHEEZING: 0
SORE THROAT: 1
HEMOPTYSIS: 0
FEVER: 0

## 2023-02-11 NOTE — PROGRESS NOTES
Subjective:   Zaire He is a 57 y.o. male who presents for Sore Throat ( X 7 days cough x 2 days on sore throat)      HPI: This is a 57-year-old male who presents today for evaluation of sore throat and cough.  Patient reports symptoms developed 7 days ago.  He reports cough is nonproductive.  He has been drinking teas, taking NyQuil, and DayQuil for symptoms.  He reports cough has kept him up at night.  He reports throat pain is mild.  Patient's spouse is home sick with similar symptoms.  No wheezing or shortness of breath.  No fevers, chills, body aches.  No underlying history of asthma or smoking.      Review of Systems   Constitutional:  Negative for chills, fever and malaise/fatigue.   HENT:  Positive for sore throat. Negative for congestion, ear discharge, ear pain and sinus pain.    Respiratory:  Positive for cough. Negative for hemoptysis, sputum production, shortness of breath, wheezing and stridor.      Medications:    Current Outpatient Medications on File Prior to Visit   Medication Sig Dispense Refill    lisinopril (PRINIVIL) 20 MG Tab Take 1 Tablet by mouth every day. 90 Tablet 0    fenofibrate (TRICOR) 145 MG Tab Take 1 Tablet by mouth every day. 90 Tablet 3     No current facility-administered medications on file prior to visit.        Allergies:   Patient has no known allergies.    Problem List:   Patient Active Problem List   Diagnosis    Prediabetes    Essential hypertension    Dyslipidemia    Family history of colon cancer        Surgical History:  Past Surgical History:   Procedure Laterality Date    CITLALLI BY LAPAROSCOPY  6/19/2020    Procedure: CHOLECYSTECTOMY, LAPAROSCOPIC;  Surgeon: Jose Young M.D.;  Location: SURGERY [facility];  Service: General       Past Social Hx:   Social History     Tobacco Use    Smoking status: Never    Smokeless tobacco: Never   Vaping Use    Vaping Use: Never used   Substance Use Topics    Alcohol use: No    Drug use: No          Problem  "list, medications, and allergies reviewed by myself today in Epic.     Objective:     /76 (BP Location: Right arm, Patient Position: Sitting, BP Cuff Size: Adult long)   Pulse 79   Temp 37.4 °C (99.4 °F) (Temporal)   Resp 16   Ht 1.626 m (5' 4\")   Wt 72.6 kg (160 lb)   SpO2 96%   BMI 27.46 kg/m²     Physical Exam  Vitals and nursing note reviewed.   Constitutional:       General: He is not in acute distress.     Appearance: Normal appearance. He is normal weight. He is not ill-appearing, toxic-appearing or diaphoretic.   HENT:      Head: Normocephalic and atraumatic.      Right Ear: Tympanic membrane, ear canal and external ear normal. There is no impacted cerumen.      Left Ear: Tympanic membrane, ear canal and external ear normal. There is no impacted cerumen.      Nose: Nose normal. No congestion or rhinorrhea.      Mouth/Throat:      Mouth: Mucous membranes are moist.      Pharynx: Oropharynx is clear. Posterior oropharyngeal erythema present. No oropharyngeal exudate.   Cardiovascular:      Rate and Rhythm: Normal rate and regular rhythm.      Pulses: Normal pulses.      Heart sounds: Normal heart sounds. No murmur heard.    No friction rub. No gallop.   Pulmonary:      Effort: Pulmonary effort is normal. No respiratory distress.      Breath sounds: Normal breath sounds. No stridor. No wheezing, rhonchi or rales.   Chest:      Chest wall: No tenderness.   Musculoskeletal:      Cervical back: Normal range of motion and neck supple. No tenderness.   Lymphadenopathy:      Cervical: No cervical adenopathy.   Skin:     General: Skin is warm and dry.      Capillary Refill: Capillary refill takes less than 2 seconds.   Neurological:      General: No focal deficit present.      Mental Status: He is alert and oriented to person, place, and time. Mental status is at baseline.   Psychiatric:         Mood and Affect: Mood normal.         Behavior: Behavior normal.         Thought Content: Thought content " normal.         Judgment: Judgment normal.       Assessment/Plan:     Diagnosis and associated orders:   1. Viral URI with cough  POCT CEPHEID GROUP A STREP - PCR    POCT CEPHEID COV-2, FLU A/B, RSV - PCR    benzonatate (TESSALON) 100 MG Cap    promethazine-dextromethorphan (PROMETHAZINE-DM) 6.25-15 MG/5ML syrup         Results for orders placed or performed in visit on 02/11/23   POCT CEPHEID GROUP A STREP - PCR   Result Value Ref Range    POC Group A Strep, PCR Not Detected Not Detected, Invalid   POCT CEPHEID COV-2, FLU A/B, RSV - PCR   Result Value Ref Range    SARS-CoV-2 by PCR Not Detected Not Detected, Invalid    Influenza virus A RNA Negative Negative, Invalid    Influenza virus B, PCR Negative Negative, Invalid    RSV, PCR Negative Negative, Invalid          Comments/MDM:   Pt is clinically stable at today's acute urgent care visit.  No acute distress noted. Appropriate for outpatient management at this time.     Acute problem.  Strep, COVID, influenza, and RSV testing all negative. I have discussed with patient that symptoms are viral in etiology. I have recommended supportive care to include; Increased fluids, rest, use cough medications as prescribed, alternating Tylenol and/or ibuprofen per manufactures instructions for symptomatic relief and fevers, and the use of a humidifier. Patient is to return to  or go to ER for any new or worsening s/s, and follow up with PCP for recheck. Patient is agreeable with plan of care and verbalized good understanding.              Discussed DDx, management options (risks,benefits, and alternatives to planned treatment), natural progression and supportive care.  Expressed understanding and the treatment plan was agreed upon. Questions were encouraged and answered   Return to urgent care prn if new or worsening sx or if there is no improvement in condition prn.    Educated in Red flags and indications to immediately call 911 or present to the Emergency Department.    Advised the patient to follow-up with the primary care physician for recheck, reevaluation, and consideration of further management.    I personally reviewed prior external notes and test results pertinent to today's visit.  I have independently reviewed and interpreted all diagnostics ordered during this urgent care acute visit.       Please note that this dictation was created using voice recognition software. I have made a reasonable attempt to correct obvious errors, but I expect that there are errors of grammar and possibly content that I did not discover before finalizing the note.    This note was electronically signed by NICK Lugo

## 2023-05-08 DIAGNOSIS — I10 ESSENTIAL HYPERTENSION: ICD-10-CM

## 2023-05-08 RX ORDER — LISINOPRIL 20 MG/1
20 TABLET ORAL DAILY
Qty: 30 TABLET | Refills: 2 | Status: SHIPPED | OUTPATIENT
Start: 2023-05-08 | End: 2023-08-22

## 2023-05-08 NOTE — TELEPHONE ENCOUNTER
Occupational Therapy Stroke Evaluation:    Today's Date: 2019  Patient Name: Lars Negron  : 1952  MRN: [id number]  Referring Provider: Majel Mohs, MD  Dx: No primary diagnosis found  Active Problem List:   Patient Active Problem List   Diagnosis    History of stroke    Hypertension    Anxiety    Hyperlipidemia    Gout    DM [facility]) [facility] [address]    Bradycardia    Left hemiparesis ([facility] [address] [facility]    Left shoulder pain    Encounter for loop recorder check     Past Medical Hx:   Past Medical History:   Diagnosis Date    Hypertension     Migraine     Stroke [facility])      Past Surgical Hx:   Past Surgical History:   Procedure Laterality Date    APPENDECTOMY      KNEE SURGERY      NECK SURGERY      TONSILLECTOMY          Pain Levels:   Resting:  {gen number [id number]}    With Activity:  {gen number [id number]}    Subjective/Patient Goal: "***"    History of Present Illness:  Pt is a pleasant, active, employed full time 61 y  o  male seen for OT eval s/p referred to [facility] s/p presented to [facility] 10/20/2018 due to left-sided weakness and facial droop   He also had a speech impairment and was found to have an acute stroke  The patient received tPA at 11:00 p m  on the day of admission after neurology assessment  He had no further complications during his hospital stay apart from a small amount of petechial hemorrhage noted on MRI  CT scan showed positive results for small infarctions within the right frontal lobe and right temporal occipital junction, and areas of petechial hemorrhage  Pt discharged to [facility] from 10/25/18 - 18   Dx'd w/ R MCA CVA, HTN, DM, comorbidities as listed above    Lifestyle Performance Model:  Autonomy: Pt was I w/ I/ADLS, drove, & required no use of DME PTA, required use of SPC since CVA has not returned to driving or work, + Give Jessica sling for LUE from [facility]    Reciprocal Relationships: Supportive son lives next door in home attached, Received request via: Pharmacy    Was the patient seen in the last year in this department? Yes    Does the patient have an active prescription (recently filled or refills available) for medication(s) requested? No    Does the patient have alf Plus and need 100 day supply (blood pressure, diabetes and cholesterol meds only)? 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 [address] travels 4-5 times a year to [place] for few weeks at time [employer], travels internationally frequently  Intrinsic Gratification: Pt reports enjoying his hot [employer] truck, his yard and pool  Pt reports having dog, Manuel  Home Setup: Pt lives in a [facility] apartment that is attached to his son's home in [place] w/ 0 MICKEY w/ 4 steps from the kitchen/bedroom to family room, son lives in the house attached next door  Objective  Impairments Section:   UE Strength:   JENNIFER: RUE: ***/200 LUE: ***/200   PINCER: 3 point pinch: RUE ***, LUE: ***   2 point pinch: RUE: ***, LUE: ***   Lateral pincher: RUE: ***, LUE: ***    Coordination:   9 HOLE PEG TEST:     RUE:  seconds, LUE:  Seconds      Functional Dexterity Test:   RUE:  seconds, LUE:  Seconds     Range of Motion:  AROM: ***    PROM: ***    MMT: ***    Pt is *** hand dominant    Sensation:  MYOFILAMENTS:   RUE: ***   LUE: ***      Assessment/Plan  Occupational Therapy Skilled Analysis Assessment and Plan of Care:  Pt requires overall mod I for ADLs/self care and mod I for fx'l mobility w/ SPC  Pt is currently demonstrating the following occupational deficits: limited 2* LUE hemiparesis w/ ~2/5 MMT proximally ~1/5 MMT distally w/ impaired FMC/FMS/GMC/GMS, LUE pronator drift, RUE wfl/wnl 5/5 MMT t/o, R handed, decreased endurance/activity tolerance, generalized weakness, deconditioning, SOB, WHYTE, fatigue, fall risk, impaired balance, flat affect, mildly depressed mood/anxiousness, L visuospatial inattention, diplopia @ 2" for near point of convergence w/ low R exophoria  Based on the aforementioned OT evaluation, functional performance deficits, and assessments, pt has been identified as a moderate complexity evaluation   Pt to continue to benefit from outpatient skilled OT services to address the following goals 2x/wk Short Term Goals for 4 weeks (2018), Long Term Goals for 8 weeks (2019), and POC to  w/in 90 days (2019) with special focus on self-care management, pt education,  and VM training, UE strength/coordination as well as motor training to improve above defiicits and enhance overall QOL/function      Pt seen for OT re-eval/progress note/status update  Pt continues to demonstrate LUE distal>proximal hypotonic hemiparesis w/ pronator drift, impaired GMC/GMS/FMC/FMS, dexterity, delayed in hand manipulation and automaticity  Continue to recommend ongoing treatment to address the following goals 2x/wk for 4 more weeks to address LUE strengthening, precision and refined FMC/FMS, gross motor sustained endurance t/o fx'l I/ADL/leisure tasks  Pt is going to [place] from -, open to scheduling 2 more weeks of OT w/ potential HOLD while on vacation and then will call to schedule more OT PRN upon return from [facility]  Pt aware and agreeable  Pt seen for OT re-eval/progress note/status update  Pt continues to demonstrate ***  Continue to recommend ongoing treatment to address the following goals 2x/wk for 4 more weeks to address ***        Goals:  Short Term Goals: (4 weeks):  Tone:  · Pt will demo good carryover of clinic and home tone strengthening strategies to for improved AROM initiation with functional reach, dressing, hygiene-MET  Modalities:  · Pt will tolerate BIONESS for improved motor and sensory performance for overall improved hand to target with 80% accuracy-PARTIALLY MET  Sensation:  · Pt will increase proprioception of  L hand to target for improved functional reach vision occluded with ADL tasks-MET  Coordination:  · Pt will increase rate of manipulation for all FM tests for improved functional performance (pinch pad, tripod, and lateral pincer grasps) with salient and fx'l I/ADL/leisure tasks-PARTIALLY MET  ROM/Function:  · Pt will increase L  UE to Gross Assist, refined functional assist, and stabilization with <20% cuing for tabletop tasks for improved functional performance of life roles and salient tasks-PARTIALLY MET  · Pt will demo with G carryover of Home Exercise Program for improved functional use of  LUE-MET  Long Term Goals: (8 weeks)  Tone:   · Improve hypotonicity of LUE  to WFL/WNL for improved alternate motor patterns, termination on command, automatic grasp/release for improved functional performance t/o I/ADL/leisure task engagement-PARTIALLY MET  Coordination:  · Increase automaticity/decrease ataxia/prehension patterns of  LUE to 100% for normalized movement pattern & resumption of B/L integrative I/ADL/leisure task engagement (including fine and gross motor)  -PARTIALLY MET  ROM/Function:  · Pt will increase L UE strength and  strength to 4+/5, through the use of strengthening exercises w/ home program review s/p skilled education to promote active fx'l movement-PARTIALLY MET     INTERVENTION COMMENTS:  Diagnosis: R MCA CVA, HTN, DM  Precautions: HTN, DM, IASTM to hand only  FOTO:   Insurance: [place] [id number] [[id number]]  6 of 7 visits, PN 6/28/2019      Thank you for the consult!   Please call if you have any questions: [phone]  SERA Rubin, OTR/L, C-KATHRYN, CSRS  Director of Outpatient Neuro Occupational Therapy

## 2023-05-31 ENCOUNTER — OFFICE VISIT (OUTPATIENT)
Dept: MEDICAL GROUP | Facility: PHYSICIAN GROUP | Age: 58
End: 2023-05-31
Payer: COMMERCIAL

## 2023-05-31 VITALS
HEART RATE: 66 BPM | SYSTOLIC BLOOD PRESSURE: 132 MMHG | HEIGHT: 64 IN | DIASTOLIC BLOOD PRESSURE: 82 MMHG | TEMPERATURE: 97.9 F | OXYGEN SATURATION: 98 % | RESPIRATION RATE: 20 BRPM | BODY MASS INDEX: 27.23 KG/M2 | WEIGHT: 159.5 LBS

## 2023-05-31 DIAGNOSIS — M25.512 CHRONIC LEFT SHOULDER PAIN: ICD-10-CM

## 2023-05-31 DIAGNOSIS — Z12.5 SCREENING FOR MALIGNANT NEOPLASM OF PROSTATE: ICD-10-CM

## 2023-05-31 DIAGNOSIS — G89.29 CHRONIC PAIN OF LEFT KNEE: ICD-10-CM

## 2023-05-31 DIAGNOSIS — G89.29 CHRONIC LEFT SHOULDER PAIN: ICD-10-CM

## 2023-05-31 DIAGNOSIS — M25.562 CHRONIC PAIN OF LEFT KNEE: ICD-10-CM

## 2023-05-31 DIAGNOSIS — E78.5 DYSLIPIDEMIA: Chronic | ICD-10-CM

## 2023-05-31 DIAGNOSIS — I10 ESSENTIAL HYPERTENSION: Chronic | ICD-10-CM

## 2023-05-31 DIAGNOSIS — R73.03 PREDIABETES: Chronic | ICD-10-CM

## 2023-05-31 DIAGNOSIS — Z00.00 WELL ADULT EXAM: ICD-10-CM

## 2023-05-31 PROCEDURE — 3079F DIAST BP 80-89 MM HG: CPT | Performed by: INTERNAL MEDICINE

## 2023-05-31 PROCEDURE — 3075F SYST BP GE 130 - 139MM HG: CPT | Performed by: INTERNAL MEDICINE

## 2023-05-31 PROCEDURE — 99214 OFFICE O/P EST MOD 30 MIN: CPT | Performed by: INTERNAL MEDICINE

## 2023-05-31 ASSESSMENT — PATIENT HEALTH QUESTIONNAIRE - PHQ9: CLINICAL INTERPRETATION OF PHQ2 SCORE: 0

## 2023-06-01 ENCOUNTER — HOSPITAL ENCOUNTER (OUTPATIENT)
Dept: RADIOLOGY | Facility: MEDICAL CENTER | Age: 58
End: 2023-06-01
Attending: INTERNAL MEDICINE
Payer: COMMERCIAL

## 2023-06-01 DIAGNOSIS — G89.29 CHRONIC PAIN OF LEFT KNEE: ICD-10-CM

## 2023-06-01 DIAGNOSIS — G89.29 CHRONIC LEFT SHOULDER PAIN: ICD-10-CM

## 2023-06-01 DIAGNOSIS — M25.512 CHRONIC LEFT SHOULDER PAIN: ICD-10-CM

## 2023-06-01 DIAGNOSIS — M25.562 CHRONIC PAIN OF LEFT KNEE: ICD-10-CM

## 2023-06-01 PROCEDURE — 73030 X-RAY EXAM OF SHOULDER: CPT | Mod: LT

## 2023-06-01 PROCEDURE — 73564 X-RAY EXAM KNEE 4 OR MORE: CPT | Mod: LT

## 2023-06-01 NOTE — ASSESSMENT & PLAN NOTE
Chronic condition.  Patient is left hand.  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  Patient denies recent trauma or injury.  Denies motor weakness or paresthesia.

## 2023-06-01 NOTE — ASSESSMENT & PLAN NOTE
This is a chronic condition for the patient currently on diet therapy.  Patient is due for lab test

## 2023-06-01 NOTE — ASSESSMENT & PLAN NOTE
This is a new condition.  Patient reported that approximately 4 months ago while at work there was a door that fell on him.  The patient stated that he used his left hand and arm trying to block the door  Patient reported recurrent pain since that time.  The patient denies fever or chills.  Pain is worse with with shoulder movement.  Patient is left-hand dominant.

## 2023-06-01 NOTE — ASSESSMENT & PLAN NOTE
chronic condition.  The patient takes Tricor 145 Mg daily.  Patient tolerating medication well.  Patient is due for lab test

## 2023-06-01 NOTE — ASSESSMENT & PLAN NOTE
Chronic condition.  The patient takes lisinopril 20 Mg daily.  Blood pressure has been well controlled.  No significant side effects reported.

## 2023-06-01 NOTE — PROGRESS NOTES
PRIMARY CARE CLINIC VISIT    Chief complaint:    Left shoulder pain  Left knee pain follow-up  Hypertension  Hyperlipidemia  Prediabetes follow-up      History of Present Illness     Chronic left shoulder pain  This is a new condition.  Patient reported that approximately 4 months ago while at work there was a door that fell on him.  The patient stated that he used his left hand and arm trying to block the door  Patient reported recurrent pain since that time.  The patient denies fever or chills.  Pain is worse with with shoulder movement.  Patient is left-hand dominant.    Chronic pain of left knee  Chronic condition.  Patient is left hand.  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  Patient denies recent trauma or injury.  Denies motor weakness or paresthesia.    Prediabetes  This is a chronic condition for the patient currently on diet therapy.  Patient is due for lab test    Essential hypertension  Chronic condition.  The patient takes lisinopril 20 Mg daily.  Blood pressure has been well controlled.  No significant side effects reported.    Dyslipidemia   chronic condition.  The patient takes Tricor 145 Mg daily.  Patient tolerating medication well.  Patient is due for lab test    Current Outpatient Medications on File Prior to Visit   Medication Sig Dispense Refill    lisinopril (PRINIVIL) 20 MG Tab TAKE 1 TABLET BY MOUTH EVERY DAY 30 Tablet 2    fenofibrate (TRICOR) 145 MG Tab Take 1 Tablet by mouth every day. 90 Tablet 3     No current facility-administered medications on file prior to visit.        Allergies: Patient has no known allergies.    Current Outpatient Medications Ordered in Epic   Medication Sig Dispense Refill    lisinopril (PRINIVIL) 20 MG Tab TAKE 1 TABLET BY MOUTH EVERY DAY 30 Tablet 2    fenofibrate (TRICOR) 145 MG Tab Take 1 Tablet by mouth every day. 90 Tablet 3     No current Epic-ordered facility-administered medications on file.       Past Medical History:   Diagnosis  "Date    High cholesterol 06/18/2020    Hypercholesteremia     Hypertension 5/14/2019       Past Surgical History:   Procedure Laterality Date    CITLALLI BY LAPAROSCOPY  6/19/2020    Procedure: CHOLECYSTECTOMY, LAPAROSCOPIC;  Surgeon: Jose Young M.D.;  Location: SURGERY [facility];  Service: General       Family History   Problem Relation Age of Onset    Cancer Mother 67        colon ca-survived    Hypertension Father     Hyperlipidemia Father     Cancer Maternal Grandmother         unk    Diabetes Paternal Grandmother     Cancer Paternal Grandfather 96        prostate CA-very old       Social History     Tobacco Use   Smoking Status Never   Smokeless Tobacco Never   Vaping Use    Vaping Use: Never used       Social History     Substance and Sexual Activity   Alcohol Use No       Review of systems.  As per HPI above. All other systems reviewed and negative.      Past Medical, Social, and Family history reviewed and updated in EPIC     Objective     /82 (BP Location: Left arm, Patient Position: Sitting, BP Cuff Size: Adult)   Pulse 66   Temp 36.6 °C (97.9 °F) (Temporal)   Resp 20   Ht 1.626 m (5' 4\")   Wt 72.3 kg (159 lb 8 oz)   SpO2 98%    Body mass index is 27.38 kg/m².    General: alert in no apparent distress.  Cardiovascular: regular rate and rhythm  Pulmonary: lungs : no wheezing   Gastrointestinal: BS present. No obvious mass noted  L Shoulder : no significant swelling redness or deformity.   ROM limited due to pain selam w shoulder abduction, flexion and extension  Pos impingement sign. Neg thumb down abduction test     L Knee : No signficant swelling redness or deformity.   ROM limited due to pain specifically w knee flexion/extension.        Lab Results   Component Value Date/Time    HBA1C 5.5 06/15/2022 07:57 AM    HBA1C 5.7 (H) 12/31/2020 07:52 AM    HBA1C 5.5 10/07/2019 07:39 AM       Lab Results   Component Value Date/Time    WBC 6.7 06/15/2022 07:57 AM    HEMOGLOBIN 16.2 06/15/2022 " 07:57 AM    HEMATOCRIT 47.8 06/15/2022 07:57 AM    MCV 88.8 06/15/2022 07:57 AM    PLATELETCT 273 06/15/2022 07:57 AM         Lab Results   Component Value Date/Time    SODIUM 140 06/15/2022 07:57 AM    POTASSIUM 4.4 06/15/2022 07:57 AM    GLUCOSE 112 (H) 06/15/2022 07:57 AM    BUN 14 06/15/2022 07:57 AM    CREATININE 0.87 06/15/2022 07:57 AM       Lab Results   Component Value Date/Time    CHOLSTRLTOT 199 06/15/2022 07:57 AM    TRIGLYCERIDE 396 (H) 06/15/2022 07:57 AM    HDL 25 (A) 06/15/2022 07:57 AM    LDL 95 06/15/2022 07:57 AM       Lab Results   Component Value Date/Time    ALTSGPT 25 06/15/2022 07:57 AM             Assessment and Plan     1. left shoulder pain  This is a new condition noted since the last 4 months  Exam today is suggestive of possible rotator cuff tendinitis/bursitis.  Rule out ligamentous injury/tear versus others  - DX-SHOULDER 2+ LEFT; Future  - Referral to Physical Therapy  - Referral to Orthopedics  Patient may take over-the-counter ibuprofen as needed for pain  2. Prediabetes  Chronic condition.  Current status unclear.  Lab test ordered for follow-up.  Recommend to continue with diet and exercise  - Basic Metabolic Panel; Future    3. Essential hypertension  Chronic stable condition.  Continue with lisinopril 20 Mg daily.  - MICROALBUMIN CREAT RATIO URINE; Future    4. Dyslipidemia  Chronic condition.  Current status unclear.  Lab test ordered for follow-up.  - ALANINE AMINO-TRANS; Future  - Lipid Profile; Future    5. Chronic pain of left knee  Chronic condition.  Likely due to degenerative joint/osteoarthritis.  Rule out other causes.  - DX-KNEE COMPLETE 4+ LEFT; Future  - Referral to Orthopedics  Refer to physical therapy for treatment  Patient may take over-the-counter ibuprofen as needed for pain                    Healthcare Maintenance       Health Maintenance Due   Topic Date Due    IMM HEP B VACCINE (1 of 3 - 3-dose series) Never done    HEPATITIS C SCREENING  Never done     IMM ZOSTER VACCINES (1 of 2) Never done               Please note that this dictation was created using voice recognition software. I have made every reasonable attempt to correct obvious errors, but I expect that there are errors of grammar and possibly content that I did not discover before finalizing the note.    Az Barry MD  Internal Medicine  [facility]

## 2023-06-29 ENCOUNTER — OFFICE VISIT (OUTPATIENT)
Dept: URGENT CARE | Facility: PHYSICIAN GROUP | Age: 58
End: 2023-06-29
Payer: COMMERCIAL

## 2023-06-29 VITALS
WEIGHT: 154 LBS | OXYGEN SATURATION: 98 % | TEMPERATURE: 97.8 F | RESPIRATION RATE: 16 BRPM | HEART RATE: 72 BPM | BODY MASS INDEX: 26.29 KG/M2 | DIASTOLIC BLOOD PRESSURE: 86 MMHG | HEIGHT: 64 IN | SYSTOLIC BLOOD PRESSURE: 132 MMHG

## 2023-06-29 DIAGNOSIS — H00.022 HORDEOLUM INTERNUM OF RIGHT LOWER EYELID: ICD-10-CM

## 2023-06-29 DIAGNOSIS — H00.031 CELLULITIS OF RIGHT UPPER EYELID: ICD-10-CM

## 2023-06-29 PROCEDURE — 99213 OFFICE O/P EST LOW 20 MIN: CPT | Performed by: PHYSICIAN ASSISTANT

## 2023-06-29 PROCEDURE — 3079F DIAST BP 80-89 MM HG: CPT | Performed by: PHYSICIAN ASSISTANT

## 2023-06-29 PROCEDURE — 3075F SYST BP GE 130 - 139MM HG: CPT | Performed by: PHYSICIAN ASSISTANT

## 2023-06-29 RX ORDER — OFLOXACIN 3 MG/ML
1 SOLUTION/ DROPS OPHTHALMIC 4 TIMES DAILY
Qty: 5 ML | Refills: 0 | Status: SHIPPED | OUTPATIENT
Start: 2023-06-29 | End: 2023-07-04

## 2023-06-29 RX ORDER — AMOXICILLIN AND CLAVULANATE POTASSIUM 875; 125 MG/1; MG/1
1 TABLET, FILM COATED ORAL 2 TIMES DAILY
Qty: 14 TABLET | Refills: 0 | Status: SHIPPED | OUTPATIENT
Start: 2023-06-29 | End: 2023-07-06

## 2023-06-29 ASSESSMENT — ENCOUNTER SYMPTOMS
EYE ITCHING: 0
FEVER: 0
BLURRED VISION: 0
DOUBLE VISION: 0
PHOTOPHOBIA: 0
FOREIGN BODY SENSATION: 0
EYE PAIN: 0
EYE DISCHARGE: 0

## 2023-06-29 NOTE — PROGRESS NOTES
Subjective     Zaire He is a 57 y.o. male who presents with Eye Problem (Right eye swelling x 1 day )    PMH:  has a past medical history of High cholesterol (06/18/2020), Hypercholesteremia, and Hypertension (5/14/2019).    He has no past medical history of Arrhythmia, Asthma, Blood transfusion without reported diagnosis, Cancer (HCC), CHF (congestive heart failure) (HCC), Clotting disorder (HCC), COPD (chronic obstructive pulmonary disease) (HCC), Diabetes (HCC), Diabetic neuropathy (HCC), GERD (gastroesophageal reflux disease), Goiter, Heart attack (HCC), IBD (inflammatory bowel disease), Migraine, Muscle disorder, Osteoporosis, Parathyroid disorder (HCC), Pituitary disease (HCC), Pulmonary emphysema (HCC), Seizure (HCC), Stroke (HCC), Tuberculosis, or Urinary bladder disorder.  MEDS:   Current Outpatient Medications:     lisinopril (PRINIVIL) 20 MG Tab, TAKE 1 TABLET BY MOUTH EVERY DAY, Disp: 30 Tablet, Rfl: 2    fenofibrate (TRICOR) 145 MG Tab, Take 1 Tablet by mouth every day., Disp: 90 Tablet, Rfl: 3  ALLERGIES: No Known Allergies  SURGHX:   Past Surgical History:   Procedure Laterality Date    CITLALLI BY LAPAROSCOPY  6/19/2020    Procedure: CHOLECYSTECTOMY, LAPAROSCOPIC;  Surgeon: Jose Young M.D.;  Location: SURGERY [facility];  Service: General     SOCHX:  reports that he has never smoked. He has never used smokeless tobacco. He reports that he does not drink alcohol and does not use drugs.  FH: Reviewed with patient, not pertinent to this visit.           Patient presents with:  Eye Problem: Right eye swelling x 1 day , very swollen, red tender upper eyelid.  Patient denies drainage or discharge from eye, denies vision changes.  Patient does not wear contact lenses, no foreign body.  Patient has tried warm compress, over-the-counter ibuprofen and Tylenol with little change in his symptoms.  No other complaints.        Eye Problem   The right eye is affected. This is a new problem.  "The current episode started yesterday. The problem occurs constantly. The problem has been gradually worsening. There was no injury mechanism. The pain is at a severity of 6/10. The pain is moderate. There is No known exposure to pink eye. He Does not wear contacts. Pertinent negatives include no blurred vision, eye discharge, double vision, fever, foreign body sensation, itching, photophobia or recent URI. Treatments tried: Warm compress. The treatment provided no relief.       Review of Systems   Constitutional:  Negative for fever.   Eyes:  Negative for blurred vision, double vision, photophobia, pain, discharge and itching.   All other systems reviewed and are negative.             Objective     /86   Pulse 72   Temp 36.6 °C (97.8 °F) (Temporal)   Resp 16   Ht 1.626 m (5' 4\")   Wt 69.9 kg (154 lb)   SpO2 98%   BMI 26.43 kg/m²      Physical Exam  Vitals and nursing note reviewed.   Constitutional:       General: He is not in acute distress.     Appearance: Normal appearance. He is well-developed. He is not ill-appearing or toxic-appearing.   HENT:      Head: Normocephalic and atraumatic.      Right Ear: Tympanic membrane normal.      Left Ear: Tympanic membrane normal.      Nose: Nose normal.      Mouth/Throat:      Lips: Pink.      Mouth: Mucous membranes are moist.      Pharynx: Oropharynx is clear. Uvula midline.   Eyes:      General: Lids are everted, no foreign bodies appreciated. Gaze aligned appropriately.         Right eye: Discharge and hordeolum present. No foreign body.      Extraocular Movements: Extraocular movements intact.      Conjunctiva/sclera: Conjunctivae normal.      Right eye: Right conjunctiva is not injected.      Pupils: Pupils are equal, round, and reactive to light.     Cardiovascular:      Rate and Rhythm: Normal rate and regular rhythm.      Pulses: Normal pulses.      Heart sounds: Normal heart sounds.   Pulmonary:      Effort: Pulmonary effort is normal.      Breath " sounds: Normal breath sounds.   Abdominal:      General: Bowel sounds are normal.      Palpations: Abdomen is soft.   Musculoskeletal:         General: Normal range of motion.      Cervical back: Normal range of motion and neck supple.   Skin:     General: Skin is warm and dry.      Capillary Refill: Capillary refill takes less than 2 seconds.   Neurological:      General: No focal deficit present.      Mental Status: He is alert and oriented to person, place, and time.      Cranial Nerves: No cranial nerve deficit.      Motor: Motor function is intact.      Coordination: Coordination is intact.      Gait: Gait normal.   Psychiatric:         Mood and Affect: Mood normal.                             Assessment & Plan             1. Hordeolum internum of right lower eyelid  ofloxacin (OCUFLOX) 0.3 % Solution    amoxicillin-clavulanate (AUGMENTIN) 875-125 MG Tab      2. Cellulitis of right upper eyelid  ofloxacin (OCUFLOX) 0.3 % Solution    amoxicillin-clavulanate (AUGMENTIN) 875-125 MG Tab        Patient HPI and physical exam are consistent with cellulitis of the right upper eyelid, likely secondary to hordeolum.    Patient to begin ofloxacin drops in right eye only, as well as Augmentin twice daily x7 days.    PT can use cool/warm compress on affected area for relief of symptoms.     PT can begin or continue OTC medications, increase fluids and rest until symptoms improve.     Differential diagnosis, supportive care, and indications for immediate follow-up discussed with patient.  Instructed to return to clinic or nearest emergency department for any change in condition, further concerns, or worsening of symptoms.    I personally reviewed prior external notes and test results pertinent to today's visit.  I have independently reviewed and interpreted all diagnostics ordered during this urgent care visit.    PT should follow up with PCP in 1-2 days for re-evaluation if symptoms have not improved.      Discussed red  flags and reasons to return to UC or ED.      Pt and/or family verbalized understanding of diagnosis and follow up instructions and was offered informational handout on diagnosis.  PT discharged.     Please note that this dictation was created using voice recognition software. I have made every reasonable attempt to correct obvious errors, but I expect that there may be errors of grammar and possibly content that I did not discover before finalizing the note.

## 2023-07-17 RX ORDER — FENOFIBRATE 145 MG/1
145 TABLET, COATED ORAL DAILY
Qty: 90 TABLET | Refills: 3 | Status: SHIPPED | OUTPATIENT
Start: 2023-07-17

## 2023-09-04 ENCOUNTER — HOSPITAL ENCOUNTER (OUTPATIENT)
Dept: RADIOLOGY | Facility: MEDICAL CENTER | Age: 58
End: 2023-09-04
Attending: ORTHOPAEDIC SURGERY
Payer: COMMERCIAL

## 2023-09-04 DIAGNOSIS — S46.012A STRAIN OF LEFT ROTATOR CUFF CAPSULE, INITIAL ENCOUNTER: ICD-10-CM

## 2023-09-04 PROCEDURE — 73221 MRI JOINT UPR EXTREM W/O DYE: CPT

## 2023-09-20 PROBLEM — M66.321 NONTRAUMATIC RUPTURE OF RIGHT PROXIMAL BICEPS TENDON: Status: ACTIVE | Noted: 2023-09-20

## 2023-09-20 PROBLEM — S43.432A SUPERIOR GLENOID LABRUM LESION OF LEFT SHOULDER: Status: ACTIVE | Noted: 2023-09-20

## 2023-09-20 PROBLEM — M75.100 ROTATOR CUFF TEAR: Status: ACTIVE | Noted: 2023-09-20

## 2023-09-20 PROBLEM — M75.50 SUBACROMIAL BURSITIS: Status: ACTIVE | Noted: 2023-09-20

## 2023-09-20 PROBLEM — M19.019 ACROMIOCLAVICULAR ARTHROSIS: Status: ACTIVE | Noted: 2023-09-20

## 2023-10-25 ENCOUNTER — OCCUPATIONAL MEDICINE (OUTPATIENT)
Dept: OCCUPATIONAL MEDICINE | Facility: CLINIC | Age: 58
End: 2023-10-25
Payer: COMMERCIAL

## 2023-10-25 VITALS
SYSTOLIC BLOOD PRESSURE: 146 MMHG | DIASTOLIC BLOOD PRESSURE: 82 MMHG | WEIGHT: 162 LBS | RESPIRATION RATE: 20 BRPM | HEIGHT: 64 IN | TEMPERATURE: 98.9 F | HEART RATE: 78 BPM | OXYGEN SATURATION: 92 % | BODY MASS INDEX: 27.66 KG/M2

## 2023-10-25 DIAGNOSIS — Z02.1 PRE-EMPLOYMENT DRUG SCREENING: ICD-10-CM

## 2023-10-25 DIAGNOSIS — S46.012D TRAUMATIC INCOMPLETE TEAR OF LEFT ROTATOR CUFF, SUBSEQUENT ENCOUNTER: Primary | ICD-10-CM

## 2023-10-25 LAB
AMP AMPHETAMINE: NORMAL
BREATH ALCOHOL COMMENT: NORMAL
COC COCAINE: NORMAL
INT CON NEG: NORMAL
INT CON POS: NORMAL
MET METHAMPHETAMINES: NORMAL
OPI OPIATES: NORMAL
PCP PHENCYCLIDINE: NORMAL
POC BREATHALIZER: 0 PERCENT (ref 0–0.01)
POC DRUG COMMENT 753798-OCCUPATIONAL HEALTH: NORMAL
THC: NORMAL

## 2023-10-25 PROCEDURE — 3079F DIAST BP 80-89 MM HG: CPT | Performed by: PREVENTIVE MEDICINE

## 2023-10-25 PROCEDURE — 3077F SYST BP >= 140 MM HG: CPT | Performed by: PREVENTIVE MEDICINE

## 2023-10-25 PROCEDURE — 99203 OFFICE O/P NEW LOW 30 MIN: CPT | Performed by: PREVENTIVE MEDICINE

## 2023-10-25 PROCEDURE — 80305 DRUG TEST PRSMV DIR OPT OBS: CPT | Performed by: PREVENTIVE MEDICINE

## 2023-10-25 PROCEDURE — 82075 ASSAY OF BREATH ETHANOL: CPT | Performed by: PREVENTIVE MEDICINE

## 2023-10-25 NOTE — LETTER
"    EMPLOYEE’S CLAIM FOR COMPENSATION/ REPORT OF INITIAL TREATMENT  FORM C-4  PLEASE TYPE OR PRINT    EMPLOYEE’S CLAIM - PROVIDE ALL INFORMATION REQUESTED   First Name                    ALENA Tai Last Name  Kiran He Birthdate                    1965                Sex  [x]M  []F Claim Number (Insurer’s Use Only)     Home Address  [address] Age  57 y.o. Height  1.626 m (5' 4\") Weight  73.5 kg (162 lb) Social Security Number     [facility] Zip  [address] Telephone  [phone] (home) [phone] (work)   Mailing Address  [address] Zip  [address] Primary Language Spoken  English    INSURER   THIRD-PARTY    BARBARA Pandya   Employee's Occupation (Job Title) When Injury or Occupational Disease Occurred  [employer]    Employer's Name/Company Name  [employer]  Telephone  [phone]    Office Mail Address (Number and Street)  [address] [place] [address]     Date of Injury (if applicable) 4/10/2023               Hours Injury (if applicable)            am               pm Date Employer Notified  9/20/2023 Last Day of Work after Injury or Occupational Disease  10/25/2023 Supervisor to Whom Injury     Reported     Address or Location of Accident (if applicable)  Work [1]   What were you doing at the time of accident? (if applicable)      How did this injury or occupational disease occur? (Be specific and answer in detail. Use additional sheet if necessary)     If you believe that you have an occupational disease, when did you first have knowledge of the disability and its relationship to your employment?   Witnesses to the Accident (if applicable)        Nature of Injury or Occupational Disease  Strain  Part(s) of Body Injured or Affected  Upper Arm (L) N/A N/A    I CERTIFY THAT THE ABOVE IS TRUE AND CORRECT TO T HE BEST OF MY KNOWLEDGE AND THAT I HAVE PROVIDED " THIS INFORMATION IN ORDER TO OBTAIN THE BENEFITS OF [place], OR [place]).  I HEREBY AUTHORIZE ANY PHYSICIAN, CHIROPRACTOR, SURGEON, PRACTITIONER OR ANY OTHER PERSON, ANY HOSPITAL, INCLUDING [facility] OR [facility], ANY  MEDICAL SERVICE ORGANIZATION, ANY INSURANCE COMPANY, OR OTHER INSTITUTION OR ORGANIZATION TO RELEASE TO EACH OTHER, ANY MEDICAL OR OTHER INFORMATION, INCLUDING BENEFITS PAID OR PAYABLE, PERTINENT TO THIS INJURY OR DISEASE, EXCEPT INFORMATION RELATIVE TO DIAGNOSIS, TREATMENT AND/OR COUNSELING FOR AIDS, PSYCHOLOGICAL CONDITIONS, ALCOHOL OR CONTROLLED SUBSTANCES, FOR WHICH I MUST GIVE SPECIFIC AUTHORIZATION.  A PHOTOSTAT OF THIS AUTHORIZATION SHALL BE VALID AS THE ORIGINAL.     Date   Place Employee’s Original or  *Electronic Signature   THIS REPORT MUST BE COMPLETED AND MAILED WITHIN 3 WORKING DAYS OF TREATMENT   Place  [facility]    Name of Facility  [facility]   Date 10/25/2023 Diagnosis and Description of Injury or Occupational Disease  (S46.012D) Traumatic incomplete tear of left rotator cuff, subsequent encounter  The encounter diagnosis was Traumatic incomplete tear of left rotator cuff, subsequent encounter. Is there evidence that the injured employee was under the influence of alcohol and/or another controlled substance at the time of accident?  []No  [] Yes (if yes, please explain)   Hour 3:05 PM  No   Treatment: Received 3 months of physical therapy, NSAIDs and MRI from orthopedics and primary care physician    Have you advised the patient to remain off work five days or more?   [] Yes Indicate dates: From   To    []No If no, is the injured employee capable of: [] full duty [] modified duty                                                             No  Yes  If modified duty, specify any limitations / restrictions:                                                                                                                                                                                                                                                                                                                                                                                                                   X-Ray Findings:   Comments:MRI 9/23 showed supraspinatus high-grade partial-thickness tear    From information given by the employee, together with medical evidence, can you directly connect this injury or occupational disease as job incurred?  []Yes   [] No Yes  Comments:Over 6 months from the injury to the time reported, but the mechanism of injury does fit the rotator cuff injury    Is additional medical care by a physician indicated? []Yes [] No  Yes    Do you know of any previous injury or disease contributing to this condition or occupational disease? []Yes [] No (Explain if yes)                          No   Date  10/25/2023 Print Health Care Provider’s Name  Claudy Madden D.O. I certify that the employer’s copy of  this form was delivered to the employer on:   Address  [address] [place] [address],   [address] INSURER'S USE ONLY                       [facility]  [address] Provider’s Tax ID Number  [id number]   Telephone  Dept: [phone]    Health Care Provider’s Original or Electronic Signature  e-SignTAYLORCLAUDY D.O. Degree (MD,DO, DC,PA-C,APRN)  DO  Choose (if applicable)      ORIGINAL - TREATING HEALTHCARE PROVIDER PAGE 2 - INSURER/TPA PAGE 3 - EMPLOYER PAGE 4 - EMPLOYEE             Form C-4 (rev.08/23)        BRIEF DESCRIPTION OF RIGHTS AND BENEFITS  (Pursuant to [place] 616[place].050)    Notice of Injury or Occupational Disease (Incident Report Form C-1): If an injury or occupational disease (OD) arises out of and in the course of employment, you must provide written notice to your employer as soon as practicable, but no later than 7 days after the accident  "or OD. Your employer shall maintain a sufficient supply of the required forms.    Claim for Compensation (Form C-4): If medical treatment is sought, the form C-4 is available at the place of initial treatment. A completed \"Claim for Compensation\" (Form C-4) must be filed within 90 days after an accident or OD. The treating physician or chiropractor must, within 3 working days after treatment, complete and mail to the employer, the employer's insurer and third-party , the Claim for Compensation.    Medical Treatment: If you require medical treatment for your on-the-job injury or OD, you may be required to select a physician or chiropractor from a list provided by your workers’ compensation insurer, if it has contracted with an Organization for Managed Care (MCO) or Preferred Provider Organization (PPO) or providers of health care. If your employer has not entered into a contract with an MCO or PPO, you may select a physician or chiropractor from the Panel of Physicians and Chiropractors. Any medical costs related to your industrial injury or OD will be paid by your insurer.    Temporary Total Disability (TTD): If your doctor has certified that you are unable to work for a period of at least 5 consecutive days, or 5 cumulative days in a 20-day period, or places restrictions on you that your employer does not accommodate, you may be entitled to TTD compensation.    Temporary Partial Disability (TPD): If the wage you receive upon reemployment is less than the compensation for TTD to which you are entitled, the insurer may be required to pay you TPD compensation to make up the difference. TPD can only be paid for a maximum of 24 months.    Permanent Partial Disability (PPD): When your medical condition is stable and there is an indication of a PPD as a result of your injury or OD, within 30 days, your insurer must arrange for an evaluation by a rating physician or chiropractor to determine the degree of your " PPD. The amount of your PPD award depends on the date of injury, the results of the PPD evaluation, your age and wage.    Permanent Total Disability (PTD): If you are medically certified by a treating physician or chiropractor as permanently and totally disabled and have been granted a PTD status by your insurer, you are entitled to receive monthly benefits not to exceed 66 2/3% of your average monthly wage. The amount of your PTD payments is subject to reduction if you previously received a lump-sum PPD award.    Vocational Rehabilitation Services: You may be eligible for vocational rehabilitation services if you are unable to return to the job due to a permanent physical impairment or permanent restrictions as a result of your injury or occupational disease.    Transportation and Per Carmelo Reimbursement: You may be eligible for travel expenses and per carmelo associated with medical treatment.    Reopening: You may be able to reopen your claim if your condition worsens after claim closure.     Appeal Process: If you disagree with a written determination issued by the insurer or the insurer does not respond to your request, you may appeal to the Department of Administration, , by following the instructions contained in your determination letter. You must appeal the determination within 70 days from the date of the determination letter at [address], [address], [place], [place] [address], or [address], [place] [address]. If you disagree with the  decision, you may appeal to the Department of Administration, . You must file your appeal within 30 days from the date of the  decision letter at [address], [address], [place] [address], or [address] [facility] [address] [facility] [address], [place] [address]. If you disagree with a decision of an , you may file a petition for judicial review with the  [facility] [address]. You must do so within 30 days of the Appeal Officer’s decision. You may be represented by an  at your own expense or you may contact the [facility] for possible representation.    [place]  for Injured Workers (NAIW): If you disagree with a  decision, you may request that NAIW represent you without charge at an  Hearing. For information regarding denial of benefits, you may contact the [facility] at: [address], [address], [place] [address], [phone], or [address], [address], [place] [address], [phone]    To File a Complaint with the Division: If you wish to file a complaint with the  of the Division of Industrial Relations (DIR),  please contact the Workers’ Compensation Section, [address], [address], [place], [place] [address], telephone [phone], or [address], [place], [place] [address], telephone [phone].    For assistance with Workers’ Compensation Issues: You may contact the [facility] Office for Consumer Health Assistance, [address], [place], [place] [address], Toll Free 1-403.725.7225, Web site: http://[facility].[place].gov/Programs/JESSICA E-mail: [email or web address]              __________________________________________________________________                                    _________________            Employee Name / Signature                                                                                                                            Date                                                                                                                                                                                                                              D-2 (rev. 10/20)

## 2023-10-25 NOTE — LETTER
[address],   [address] [place] [address]  Phone:  [phone] - Fax:  [phone]   Occupational Health [facility] Progress Report and Disability Certification  Date of Service: 10/25/2023   No Show:  No  Date / Time of Next Visit: 11/22/2023 @ 9:30 AM   Claim Information   Patient Name: Zaire He  Claim Number:     Employer: [employer]  Date of Injury: 4/10/2023     Insurer / TPA:   BARBARA Pandya ID / SSN:     Occupation: [employer]  Diagnosis: The encounter diagnosis was Traumatic incomplete tear of left rotator cuff, subsequent encounter.    Medical Information   Related to Industrial Injury? Yes  Comments:Over 6 months from the injury to the time reported, but the mechanism of injury does fit the rotator cuff injury.    Subjective Complaints:  DOI 4/10/2023: 57-year-old injured worker presents with left shoulder injury.  GARCÍA: He was trying to move a table near a stack of tables, one of the tables from the top of the stack fell and he used his left arm to catch the table.  He felt immediate pain to the left shoulder but the pain was generally mild to moderate.  He states he thought that it would eventually go away on its own so did not seek care or make a report.  Symptoms did not improve and he was seen approximately 6 weeks later by his primary care physician who recommended physical therapy, NSAIDs and was referred to orthopedic surgery.  It was noted in the visit note that he reported to his primary care physician that the injury did indeed occur at work, but he has not advised in regards to whether not to file work comp claim.  He then saw Dr. Bazan at the [facility] June 2023, at the time he reportedly completed physical therapy without much improvement.  There was concern for rotator cuff pathology and so MRI was ordered.  This was performed in September 2023 and this revealed high-grade partial-thickness tear of the  supraspinatus tendon.  He was seen in follow-up at the [facility] and recommended for left shoulder arthroscopy.  This at this time that he notified his employer that he would need time off for the surgery upon inquiring more his employer learned that the injury occurred at work and he was advised to file a work comp claim.  He has a date for the surgery on November 7.  He states that overall left shoulder pain is about the same.  Pain is mostly anterior.  He does have pain with range of motion above shoulder level.  It does feel little bit weak.  However he states he has been able to work his full duty job he has flexibility and able to ask for help when he needs it and so has been working fine without work restrictions.   Objective Findings: Left shoulder: No gross deformity.  Tenderness palpation of the anterior GH.  Range of motion diminished about 100 degrees flexion/abduction.  Empty can test positive.  Speed's Test negative.   Pre-Existing Condition(s):     Assessment:   Initial Visit    Status: Additional Care Required  Permanent Disability:No    Plan:      Diagnostics:      Comments:  Advised patient that given the duration of time from the injury to the date he reported it his claim may not be covered by Worker's Compensation.  However did advise him to call the [facility] advised them that he has not filed a workers comp claim.  He was advised that they would likely cancel the surgery until he gets authorized through Worker's Comp. advised him to call and contact his work comp insurance systems possible since they will likely request medical records from previous treatment from his primary care physician and [facility] before deciding on his claim.  Referral to orthopedics-Dr. Bazan, [facility]  Continue gentle range of motion exercises and stretches  OTC ibuprofen/Tylenol as needed  Heat/ice as needed  Okay to continue full duty  Follow-up 4 weeks if not  seen by orthopedics      Disability Information   Status: Released to Full Duty    From:  10/25/2023  Through: 11/22/2023 Restrictions are:     Physical Restrictions   Sitting:    Standing:    Stooping:    Bending:      Squatting:    Walking:    Climbing:    Pushing:      Pulling:    Other:    Reaching Above Shoulder (L):   Reaching Above Shoulder (R):       Reaching Below Shoulder (L):    Reaching Below Shoulder (R):      Not to exceed Weight Limits   Carrying(hrs):   Weight Limit(lb):   Lifting(hrs):   Weight  Limit(lb):     Comments:      Repetitive Actions   Hands: i.e. Fine Manipulations from Grasping:     Feet: i.e. Operating Foot Controls:     Driving / Operate Machinery:     Health Care Provider’s Original or Electronic Signature  Claudy Madden D.O. Health Care Provider’s Original or Electronic Signature    Claudy Madden DO MPH     Clinic Name / Location: [address],   [address] [place], [place] [address], [place] [address] Clinic Phone Number: Dept: [phone]   Appointment Time: 3:00 Pm Visit Start Time: 3:05 PM   Check-In Time:  2:58 Pm Visit Discharge Time:  3:47 PM   Original-Treating Physician or Chiropractor    Page 2-Insurer/TPA    Page 3-Employer    Page 4-Employee

## 2023-10-25 NOTE — PROGRESS NOTES
Subjective:     Zaire He is a 57 y.o. 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  GARCÍA: He was trying to move a table near a stack of tables, one of the tables from the top of the stack fell and he used his left arm to catch the table.  He felt immediate pain to the left shoulder but the pain was generally mild to moderate.  He states he thought that it would eventually go away on its own so did not seek care or make a report.  Symptoms did not improve and he was seen approximately 6 weeks later by his primary care physician who recommended physical therapy, NSAIDs and was referred to orthopedic surgery.  It was noted in the visit note that he reported to his primary care physician that the injury did indeed occur at work, but he has not advised in regards to whether not to file work comp claim.  He then saw Dr. Bazan at the [facility] June 2023, at the time he reportedly completed physical therapy without much improvement.  There was concern for rotator cuff pathology and so MRI was ordered.  This was performed in September 2023 and this revealed high-grade partial-thickness tear of the supraspinatus tendon.  He was seen in follow-up at the [facility] and recommended for left shoulder arthroscopy.  This at this time that he notified his employer that he would need time off for the surgery upon inquiring more his employer learned that the injury occurred at work and he was advised to file a work comp claim.  He has a date for the surgery on November 7.  He states that overall left shoulder pain is about the same.  Pain is mostly anterior.  He does have pain with range of motion above shoulder level.  It does feel little bit weak.  However he states he has been able to work his full duty job he has flexibility and able to ask for help when he needs it and so has been working fine without work  "restrictions.    ROS: All systems were reviewed on intake form, form was reviewed and signed. See scanned documents in media. Pertinent positives and negatives included in HPI.    PMH: No pertinent past medical history to this problem  MEDS: Medications were reviewed in Epic  ALLERGIES: No Known Allergies  SOCHX: Works as a banquet worker at the [employer]  FH: No pertinent family history to this problem       Objective:     BP (!) 146/82   Pulse 78   Temp 37.2 °C (98.9 °F) (Temporal)   Resp 20   Ht 1.626 m (5' 4\")   Wt 73.5 kg (162 lb)   SpO2 92%   BMI 27.81 kg/m²     Constitutional: Patient is in no acute distress. Appears well-developed and well-nourished.   HENT: Normocephalic and atraumatic. EOM are normal. No scleral icterus.   Cardiovascular: Normal rate.    Pulmonary/Chest: Effort normal. No respiratory distress.   Neurological: Patient is alert and oriented to person, place, and time.   Skin: Skin is warm and dry.   Psychiatric: Normal mood and affect. Behavior is normal.     Left shoulder: No gross deformity.  Tenderness palpation of the anterior GH.  Range of motion diminished about 100 degrees flexion/abduction.  Empty can test positive.  Speed's Test negative.    Assessment/Plan:       1. Traumatic incomplete tear of left rotator cuff, subsequent encounter    Released to Full Duty FROM 10/25/2023 TO 11/22/2023     Advised patient that given the duration of time from the injury to the date he reported it his claim may not be covered by Worker's Compensation.  However did advise him to call the [facility] advised them that he has not filed a workers comp claim.  He was advised that they would likely cancel the surgery until he gets authorized through Worker's Comp. advised him to call and contact his work comp insurance systems possible since they will likely request medical records from previous treatment from his primary care physician and [facility] before deciding on his " claim.  Referral to orthopedics-Dr. Bazan [facility]  Continue gentle range of motion exercises and stretches  OTC ibuprofen/Tylenol as needed  Heat/ice as needed  Okay to continue full duty  Follow-up 4 weeks if not seen by orthopedics      Differential diagnosis, natural history, supportive care, and indications for immediate follow-up discussed.    Approximately 35 minutes were spent in reviewing notes, preparing for visit, obtaining history, exam and evaluation, patient counseling/education and post visit documentation/orders.

## 2023-12-06 ENCOUNTER — OCCUPATIONAL MEDICINE (OUTPATIENT)
Dept: OCCUPATIONAL MEDICINE | Facility: CLINIC | Age: 58
End: 2023-12-06
Payer: COMMERCIAL

## 2023-12-06 VITALS
HEART RATE: 61 BPM | TEMPERATURE: 98 F | OXYGEN SATURATION: 98 % | SYSTOLIC BLOOD PRESSURE: 130 MMHG | BODY MASS INDEX: 27.83 KG/M2 | DIASTOLIC BLOOD PRESSURE: 82 MMHG | RESPIRATION RATE: 18 BRPM | WEIGHT: 163 LBS | HEIGHT: 64 IN

## 2023-12-06 DIAGNOSIS — S46.012D TRAUMATIC INCOMPLETE TEAR OF LEFT ROTATOR CUFF, SUBSEQUENT ENCOUNTER: ICD-10-CM

## 2023-12-06 PROCEDURE — 3079F DIAST BP 80-89 MM HG: CPT | Performed by: PREVENTIVE MEDICINE

## 2023-12-06 PROCEDURE — 3075F SYST BP GE 130 - 139MM HG: CPT | Performed by: PREVENTIVE MEDICINE

## 2023-12-06 PROCEDURE — 1125F AMNT PAIN NOTED PAIN PRSNT: CPT | Performed by: PREVENTIVE MEDICINE

## 2023-12-06 PROCEDURE — 99213 OFFICE O/P EST LOW 20 MIN: CPT | Performed by: PREVENTIVE MEDICINE

## 2023-12-06 ASSESSMENT — PAIN SCALES - GENERAL: PAINLEVEL: 8=MODERATE-SEVERE PAIN

## 2023-12-06 NOTE — LETTER
[facility] [address],   [address] [place] [address]  Phone:  [phone] - Fax:  [phone]   [facility] Progress Report and Disability Certification  Date of Service: 12/6/2023   No Show:  No  Date / Time of Next Visit: 1/9/2024 @ 9:30am   Claim Information   Patient Name: Zaire He  Claim Number:     Employer: [employer]  Date of Injury: 4/10/2023     Insurer / TPA: Irvin  ID / SSN:     Occupation: [employer]  Diagnosis: The encounter diagnosis was Traumatic incomplete tear of left rotator cuff, subsequent encounter.    Medical Information   Related to Industrial Injury? 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  GARCÍA: He was trying to move a table near a stack of tables, one of the tables from the top of the stack fell and he used his left arm to catch the table.     Patient states that he filed on the paperwork that he asked of by the work comp insurance.  He states he still has not heard back or received any letter from them in regards to whether or not his claim is approved.  Symptoms remain the same.     Objective Findings:   Left shoulder: No gross deformity.  Tenderness palpation of the anterior GH.  Range of motion diminished about 100 degrees flexion/abduction.  Empty can test positive.  Speed's Test negative.      Pre-Existing Condition(s):     Assessment:   Condition Same    Status: Additional Care Required  Permanent Disability:No    Plan:      Diagnostics:      Comments:  Referral to orthopedics-[facility]  Continue gentle range of motion exercises and stretches  OTC ibuprofen/Tylenol as needed  Heat/ice as needed  Okay to continue full duty  Follow-up 4 weeks if not seen by orthopedics      Disability Information   Status: Released to Full Duty    From:  12/6/2023  Through: 1/9/2024 Restrictions are: Temporary   Physical  Restrictions   Sitting:    Standing:    Stooping:    Bending:      Squatting:    Walking:    Climbing:    Pushing:      Pulling:    Other:    Reaching Above Shoulder (L):   Reaching Above Shoulder (R):       Reaching Below Shoulder (L):    Reaching Below Shoulder (R):      Not to exceed Weight Limits   Carrying(hrs):   Weight Limit(lb):   Lifting(hrs):   Weight  Limit(lb):     Comments:      Repetitive Actions   Hands: i.e. Fine Manipulations from Grasping:     Feet: i.e. Operating Foot Controls:     Driving / Operate Machinery:     Health Care Provider’s Original or Electronic Signature  Claudy Madden D.O. Health Care Provider’s Original or Electronic Signature    Claudy Madden DO MPH     Clinic Name / Location: [address],   [address] [place] [address] Clinic Phone Number: Dept: [phone]   Appointment Time: 9:30 Am Visit Start Time: 9:21 AM   Check-In Time:  9:19 Am Visit Discharge Time:  10:06am   Original-Treating Physician or Chiropractor    Page 2-Insurer/TPA    Page 3-Employer    Page 4-Employee

## 2023-12-06 NOTE — PROGRESS NOTES
"Subjective:     Zaire He is a 58 y.o. 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  GARCÍA: He was trying to move a table near a stack of tables, one of the tables from the top of the stack fell and he used his left arm to catch the table.     Patient states that he filed on the paperwork that he asked of by the work comp insurance.  He states he still has not heard back or received any letter from them in regards to whether or not his claim is approved.  Symptoms remain the same.      ROS       Objective:     /82 (BP Location: Right arm, Patient Position: Sitting)   Pulse 61   Temp 36.7 °C (98 °F) (Temporal)   Resp 18   Ht 1.626 m (5' 4\")   Wt 73.9 kg (163 lb)   SpO2 98%   BMI 27.98 kg/m²     Constitutional: Patient is in no acute distress. Appears well-developed and well-nourished.   Cardiovascular: Normal rate.    Pulmonary/Chest: Effort normal. No respiratory distress.   Neurological: Patient is alert and oriented to person, place, and time.   Skin: Skin is warm and dry.   Psychiatric: Normal mood and affect. Behavior is normal.       Left shoulder: No gross deformity.  Tenderness palpation of the anterior GH.  Range of motion diminished about 100 degrees flexion/abduction.  Empty can test positive.  Speed's Test negative.       Assessment/Plan:       1. Traumatic incomplete tear of left rotator cuff, subsequent encounter    Released to Full Duty FROM 12/6/2023 TO 1/9/2024       Referral to orthopedics-Dr. [facility]  Continue gentle range of motion exercises and stretches  OTC ibuprofen/Tylenol as needed  Heat/ice as needed  Okay to continue full duty  Follow-up 4 weeks if not seen by orthopedics      Differential diagnosis, natural history, supportive care, and indications for immediate follow-up discussed.    Approximately 25 minutes was spent in " preparing for visit, obtaining history, exam and evaluation, patient counseling/education and post visit documentation/orders.

## 2024-06-27 NOTE — LETTER
June 29, 2023         Patient: Zaire He   YOB: 1965   Date of Visit: 6/29/2023           To Whom it May Concern:    Zaire He was seen in my clinic on 6/29/2023. He may return to work on 7/01/2023 or sooner if condition improves sooner.   .    If you have any questions or concerns, please don't hesitate to call.        Sincerely,           Deborah Talamantes P.A.-C.  Electronically Signed      Products Recommended: SPF 40 with zinc oxide and titanium Detail Level: Generalized General Sunscreen Counseling: I recommended a broad spectrum sunscreen with a SPF of 30 or higher.  I explained that SPF 30 sunscreens block approximately 97 percent of the sun's harmful rays.  Sunscreens should be applied at least 15 minutes prior to expected sun exposure and then every 2 hours after that as long as sun exposure continues. If swimming or exercising sunscreen should be reapplied every 45 minutes to an hour after getting wet or sweating.  One ounce, or the equivalent of a shot glass full of sunscreen, is adequate to protect the skin not covered by a bathing suit. I also recommended a lip balm with a sunscreen as well. Sun protective clothing can be used in lieu of sunscreen but must be worn the entire time you are exposed to the sun's rays.

## 2024-07-17 ENCOUNTER — TELEPHONE (OUTPATIENT)
Dept: MEDICAL GROUP | Facility: PHYSICIAN GROUP | Age: 59
End: 2024-07-17

## 2024-07-17 ENCOUNTER — HOSPITAL ENCOUNTER (OUTPATIENT)
Dept: RADIOLOGY | Facility: MEDICAL CENTER | Age: 59
End: 2024-07-17
Attending: INTERNAL MEDICINE
Payer: COMMERCIAL

## 2024-07-17 ENCOUNTER — HOSPITAL ENCOUNTER (OUTPATIENT)
Dept: LAB | Facility: MEDICAL CENTER | Age: 59
End: 2024-07-17
Attending: INTERNAL MEDICINE
Payer: COMMERCIAL

## 2024-07-17 ENCOUNTER — OFFICE VISIT (OUTPATIENT)
Dept: MEDICAL GROUP | Facility: PHYSICIAN GROUP | Age: 59
End: 2024-07-17
Payer: COMMERCIAL

## 2024-07-17 VITALS
WEIGHT: 163.2 LBS | HEIGHT: 64 IN | OXYGEN SATURATION: 98 % | TEMPERATURE: 97.9 F | HEART RATE: 61 BPM | SYSTOLIC BLOOD PRESSURE: 128 MMHG | DIASTOLIC BLOOD PRESSURE: 84 MMHG | BODY MASS INDEX: 27.86 KG/M2

## 2024-07-17 DIAGNOSIS — M89.9 LYTIC BONE LESIONS ON XRAY: ICD-10-CM

## 2024-07-17 DIAGNOSIS — E78.5 DYSLIPIDEMIA: Chronic | ICD-10-CM

## 2024-07-17 DIAGNOSIS — M25.562 CHRONIC PAIN OF BOTH KNEES: ICD-10-CM

## 2024-07-17 DIAGNOSIS — M25.551 HIP PAIN, BILATERAL: ICD-10-CM

## 2024-07-17 DIAGNOSIS — I10 ESSENTIAL HYPERTENSION: Chronic | ICD-10-CM

## 2024-07-17 DIAGNOSIS — Z00.00 WELL ADULT EXAM: ICD-10-CM

## 2024-07-17 DIAGNOSIS — G89.29 CHRONIC PAIN OF BOTH KNEES: ICD-10-CM

## 2024-07-17 DIAGNOSIS — M25.552 HIP PAIN, BILATERAL: ICD-10-CM

## 2024-07-17 DIAGNOSIS — Z11.4 SCREENING FOR HIV (HUMAN IMMUNODEFICIENCY VIRUS): ICD-10-CM

## 2024-07-17 DIAGNOSIS — Z11.59 NEED FOR HEPATITIS C SCREENING TEST: ICD-10-CM

## 2024-07-17 DIAGNOSIS — M25.561 CHRONIC PAIN OF BOTH KNEES: ICD-10-CM

## 2024-07-17 DIAGNOSIS — R73.03 PREDIABETES: Chronic | ICD-10-CM

## 2024-07-17 PROBLEM — Z23 NEED FOR VACCINATION: Status: ACTIVE | Noted: 2024-07-17

## 2024-07-17 PROBLEM — M89.8X9 LYTIC BONE LESIONS ON XRAY: Status: ACTIVE | Noted: 2024-07-17

## 2024-07-17 LAB
BASOPHILS # BLD AUTO: 1.2 % (ref 0–1.8)
BASOPHILS # BLD: 0.08 K/UL (ref 0–0.12)
BUN SERPL-MCNC: 11 MG/DL (ref 8–22)
CALCIUM SERPL-MCNC: 9.8 MG/DL (ref 8.5–10.5)
CREAT SERPL-MCNC: 0.91 MG/DL (ref 0.5–1.4)
EOSINOPHIL # BLD AUTO: 0.1 K/UL (ref 0–0.51)
EOSINOPHIL NFR BLD: 1.5 % (ref 0–6.9)
ERYTHROCYTE [DISTWIDTH] IN BLOOD BY AUTOMATED COUNT: 39.9 FL (ref 35.9–50)
GFR SERPLBLD CREATININE-BSD FMLA CKD-EPI: 99 ML/MIN/1.73 M 2
HCT VFR BLD AUTO: 46.3 % (ref 42–52)
HGB BLD-MCNC: 15.7 G/DL (ref 14–18)
IMM GRANULOCYTES # BLD AUTO: 0.04 K/UL (ref 0–0.11)
IMM GRANULOCYTES NFR BLD AUTO: 0.6 % (ref 0–0.9)
LYMPHOCYTES # BLD AUTO: 3.25 K/UL (ref 1–4.8)
LYMPHOCYTES NFR BLD: 48.5 % (ref 22–41)
MCH RBC QN AUTO: 30 PG (ref 27–33)
MCHC RBC AUTO-ENTMCNC: 33.9 G/DL (ref 32.3–36.5)
MCV RBC AUTO: 88.5 FL (ref 81.4–97.8)
MONOCYTES # BLD AUTO: 0.42 K/UL (ref 0–0.85)
MONOCYTES NFR BLD AUTO: 6.3 % (ref 0–13.4)
NEUTROPHILS # BLD AUTO: 2.81 K/UL (ref 1.82–7.42)
NEUTROPHILS NFR BLD: 41.9 % (ref 44–72)
NRBC # BLD AUTO: 0 K/UL
NRBC BLD-RTO: 0 /100 WBC (ref 0–0.2)
PLATELET # BLD AUTO: 287 K/UL (ref 164–446)
PMV BLD AUTO: 9.5 FL (ref 9–12.9)
RBC # BLD AUTO: 5.23 M/UL (ref 4.7–6.1)
WBC # BLD AUTO: 6.7 K/UL (ref 4.8–10.8)

## 2024-07-17 PROCEDURE — 82565 ASSAY OF CREATININE: CPT

## 2024-07-17 PROCEDURE — 84155 ASSAY OF PROTEIN SERUM: CPT

## 2024-07-17 PROCEDURE — 3074F SYST BP LT 130 MM HG: CPT | Performed by: INTERNAL MEDICINE

## 2024-07-17 PROCEDURE — 3079F DIAST BP 80-89 MM HG: CPT | Performed by: INTERNAL MEDICINE

## 2024-07-17 PROCEDURE — 73565 X-RAY EXAM OF KNEES: CPT

## 2024-07-17 PROCEDURE — 84165 PROTEIN E-PHORESIS SERUM: CPT

## 2024-07-17 PROCEDURE — 85025 COMPLETE CBC W/AUTO DIFF WBC: CPT

## 2024-07-17 PROCEDURE — 82310 ASSAY OF CALCIUM: CPT

## 2024-07-17 PROCEDURE — 84520 ASSAY OF UREA NITROGEN: CPT

## 2024-07-17 PROCEDURE — 99214 OFFICE O/P EST MOD 30 MIN: CPT | Performed by: INTERNAL MEDICINE

## 2024-07-17 PROCEDURE — 36415 COLL VENOUS BLD VENIPUNCTURE: CPT

## 2024-07-17 PROCEDURE — 73521 X-RAY EXAM HIPS BI 2 VIEWS: CPT

## 2024-07-17 RX ORDER — NAPROXEN 500 MG/1
500 TABLET ORAL 2 TIMES DAILY PRN
Qty: 60 TABLET | Refills: 3 | Status: SHIPPED | OUTPATIENT
Start: 2024-07-17

## 2024-07-17 ASSESSMENT — PATIENT HEALTH QUESTIONNAIRE - PHQ9: CLINICAL INTERPRETATION OF PHQ2 SCORE: 0

## 2024-07-18 ENCOUNTER — HOSPITAL ENCOUNTER (OUTPATIENT)
Dept: LAB | Facility: MEDICAL CENTER | Age: 59
End: 2024-07-18
Attending: INTERNAL MEDICINE
Payer: COMMERCIAL

## 2024-07-18 DIAGNOSIS — D70.9 NEUTROPENIA, UNSPECIFIED TYPE (HCC): ICD-10-CM

## 2024-07-18 DIAGNOSIS — Z11.4 SCREENING FOR HIV (HUMAN IMMUNODEFICIENCY VIRUS): ICD-10-CM

## 2024-07-18 DIAGNOSIS — Z11.59 NEED FOR HEPATITIS C SCREENING TEST: ICD-10-CM

## 2024-07-18 DIAGNOSIS — M89.9 LYTIC BONE LESIONS ON XRAY: ICD-10-CM

## 2024-07-18 DIAGNOSIS — E78.5 DYSLIPIDEMIA: Chronic | ICD-10-CM

## 2024-07-18 DIAGNOSIS — R73.03 PREDIABETES: Chronic | ICD-10-CM

## 2024-07-18 DIAGNOSIS — Z00.00 WELL ADULT EXAM: ICD-10-CM

## 2024-07-18 PROBLEM — E55.9 VITAMIN D DEFICIENCY: Status: ACTIVE | Noted: 2024-07-18

## 2024-07-18 PROBLEM — E55.9 VITAMIN D DEFICIENCY: Chronic | Status: ACTIVE | Noted: 2024-07-18

## 2024-07-18 LAB
25(OH)D3 SERPL-MCNC: 27 NG/ML (ref 30–100)
ALT SERPL-CCNC: 32 U/L (ref 2–50)
ANION GAP SERPL CALC-SCNC: 9 MMOL/L (ref 7–16)
BUN SERPL-MCNC: 13 MG/DL (ref 8–22)
CALCIUM SERPL-MCNC: 9.6 MG/DL (ref 8.5–10.5)
CHLORIDE SERPL-SCNC: 106 MMOL/L (ref 96–112)
CHOLEST SERPL-MCNC: 192 MG/DL (ref 100–199)
CO2 SERPL-SCNC: 26 MMOL/L (ref 20–33)
CREAT SERPL-MCNC: 0.91 MG/DL (ref 0.5–1.4)
EST. AVERAGE GLUCOSE BLD GHB EST-MCNC: 114 MG/DL
GFR SERPLBLD CREATININE-BSD FMLA CKD-EPI: 99 ML/MIN/1.73 M 2
GLUCOSE SERPL-MCNC: 115 MG/DL (ref 65–99)
HBA1C MFR BLD: 5.6 % (ref 4–5.6)
HCV AB SER QL: NORMAL
HDLC SERPL-MCNC: 37 MG/DL
HIV 1+2 AB+HIV1 P24 AG SERPL QL IA: NORMAL
LDLC SERPL CALC-MCNC: 132 MG/DL
POTASSIUM SERPL-SCNC: 4.3 MMOL/L (ref 3.6–5.5)
PSA SERPL-MCNC: 0.55 NG/ML (ref 0–4)
SODIUM SERPL-SCNC: 141 MMOL/L (ref 135–145)
TRIGL SERPL-MCNC: 115 MG/DL (ref 0–149)
TSH SERPL-ACNC: 1.23 UIU/ML (ref 0.35–5.5)

## 2024-07-18 PROCEDURE — 84443 ASSAY THYROID STIM HORMONE: CPT

## 2024-07-18 PROCEDURE — 87389 HIV-1 AG W/HIV-1&-2 AB AG IA: CPT

## 2024-07-18 PROCEDURE — 83036 HEMOGLOBIN GLYCOSYLATED A1C: CPT

## 2024-07-18 PROCEDURE — 36415 COLL VENOUS BLD VENIPUNCTURE: CPT

## 2024-07-18 PROCEDURE — 80048 BASIC METABOLIC PNL TOTAL CA: CPT

## 2024-07-18 PROCEDURE — 82306 VITAMIN D 25 HYDROXY: CPT

## 2024-07-18 PROCEDURE — 80061 LIPID PANEL: CPT

## 2024-07-18 PROCEDURE — 84460 ALANINE AMINO (ALT) (SGPT): CPT

## 2024-07-18 PROCEDURE — 86803 HEPATITIS C AB TEST: CPT

## 2024-07-18 PROCEDURE — 84153 ASSAY OF PSA TOTAL: CPT

## 2024-07-18 RX ORDER — FENOFIBRATE 145 MG/1
145 TABLET, COATED ORAL DAILY
Qty: 30 TABLET | Refills: 11 | Status: SHIPPED | OUTPATIENT
Start: 2024-07-18

## 2024-07-19 ENCOUNTER — HOSPITAL ENCOUNTER (OUTPATIENT)
Facility: MEDICAL CENTER | Age: 59
End: 2024-07-19
Attending: INTERNAL MEDICINE
Payer: COMMERCIAL

## 2024-07-19 PROCEDURE — 84156 ASSAY OF PROTEIN URINE: CPT

## 2024-07-19 PROCEDURE — 86335 IMMUNFIX E-PHORSIS/URINE/CSF: CPT

## 2024-07-19 PROCEDURE — 84166 PROTEIN E-PHORESIS/URINE/CSF: CPT

## 2024-07-24 LAB
ALBUMIN SERPL ELPH-MCNC: 4.37 G/DL (ref 3.75–5.01)
ALPHA1 GLOB SERPL ELPH-MCNC: 0.26 G/DL (ref 0.19–0.46)
ALPHA2 GLOB SERPL ELPH-MCNC: 0.53 G/DL (ref 0.48–1.05)
B-GLOBULIN SERPL ELPH-MCNC: 0.78 G/DL (ref 0.48–1.1)
GAMMA GLOB SERPL ELPH-MCNC: 1.46 G/DL (ref 0.62–1.51)
INTERPRETATION SERPL IFE-IMP: NORMAL
MONOCLON BAND OBS SERPL: NORMAL
MONOCLONAL PROTEIN NL11656: NORMAL G/DL
PATHOLOGY STUDY: NORMAL
PROT SERPL-MCNC: 7.4 G/DL (ref 6.3–8.2)

## 2024-07-29 LAB
ALBUMIN 24H MFR UR ELPH: 100 %
ALPHA1 GLOB 24H MFR UR ELPH: 0 %
ALPHA2 GLOB 24H MFR UR ELPH: 0 %
B-GLOBULIN 24H MFR UR ELPH: 0 %
COLLECT DURATION TIME SPEC: 24 HRS
EER MONOCLONAL PROTEIN STUDY, 24 HOUR U Q5964: NORMAL
GAMMA GLOB 24H MFR UR ELPH: 0 %
INTERPRETATION UR IFE-IMP: NORMAL
M PROTEIN 24H MFR UR ELPH: 0 %
M PROTEIN 24H UR ELPH-MRATE: 0 MG/24 HRS
PROT UR-MCNC: NORMAL G/DL
SPECIMEN VOL ?TM UR: 1200 ML

## 2024-07-31 ENCOUNTER — PHYSICAL THERAPY (OUTPATIENT)
Dept: PHYSICAL THERAPY | Facility: REHABILITATION | Age: 59
End: 2024-07-31
Attending: INTERNAL MEDICINE
Payer: COMMERCIAL

## 2024-07-31 DIAGNOSIS — M25.551 HIP PAIN, BILATERAL: ICD-10-CM

## 2024-07-31 DIAGNOSIS — M25.562 CHRONIC PAIN OF BOTH KNEES: ICD-10-CM

## 2024-07-31 DIAGNOSIS — G89.29 CHRONIC PAIN OF BOTH KNEES: ICD-10-CM

## 2024-07-31 DIAGNOSIS — M25.552 HIP PAIN, BILATERAL: ICD-10-CM

## 2024-07-31 DIAGNOSIS — M25.561 CHRONIC PAIN OF BOTH KNEES: ICD-10-CM

## 2024-07-31 PROCEDURE — 97162 PT EVAL MOD COMPLEX 30 MIN: CPT

## 2024-07-31 ASSESSMENT — ENCOUNTER SYMPTOMS
PAIN SCALE AT HIGHEST: 5
PAIN SCALE: 1
PAIN SCALE AT LOWEST: 1

## 2024-08-07 ENCOUNTER — PHYSICAL THERAPY (OUTPATIENT)
Dept: PHYSICAL THERAPY | Facility: REHABILITATION | Age: 59
End: 2024-08-07
Attending: INTERNAL MEDICINE
Payer: COMMERCIAL

## 2024-08-07 DIAGNOSIS — M25.551 HIP PAIN, BILATERAL: ICD-10-CM

## 2024-08-07 DIAGNOSIS — M25.552 HIP PAIN, BILATERAL: ICD-10-CM

## 2024-08-07 PROCEDURE — 97110 THERAPEUTIC EXERCISES: CPT

## 2024-08-07 PROCEDURE — 97140 MANUAL THERAPY 1/> REGIONS: CPT

## 2024-08-07 NOTE — OP THERAPY DAILY TREATMENT
"  Outpatient Physical Therapy  DAILY TREATMENT     [facility] Physical [facility] [address].  [address]  [place] [address]  Phone:  [phone]  Fax:  [phone]    Date: 08/07/2024    Patient: Zaire He  YOB: 1965  MRN: [id number]     Time Calculation    Start time: 1030  Stop time: 1111 Time Calculation (min): 41 minutes         Chief Complaint: Hip Problem    Visit #: 2    SUBJECTIVE:  Pt reports no changes in symptoms since initial eval. Reports pain in R hip when in R side sidelying.     OBJECTIVE:  Current objective measures:   Lumbar ROM  Flex: WNL  Ext: WNL  LSB: painful R hip  RSB: painful R hip  L rot:  WNL  L rot: WNL              Therapeutic Exercises (CPT 77564):     1. Figure 4 piriformis stretch, 2x30\" hold    2. Supine sciatic nerve glide, x10    3. Bridge marches, to fatigue    4. Standing fire hydrant, 5x15\" hold ea    5. Bird dogs, unable to maintain proper positioning    6. Qped hip extension, 10x5\" hold ea    7. Clams w/ OTB, x15 ea      Therapeutic Exercise Summary: Access Code: [email or web address]  URL: [email or web address]  Date: 08/07/2024  Prepared by: Mónica Grant    Exercises  - Supine Figure 4 Piriformis Stretch  - 2 x daily - 7 x weekly - 3 sets - 30 second hold  - Supine Sciatic Nerve Glide  - 2 x daily - 7 x weekly - 10 reps  - Marching Bridge  - 1 x daily - 7 x weekly - 3 sets  - Standing Hip Abduction with Bent Knee  - 1 x daily - 7 x weekly - 3 sets - 10 reps    Therapeutic Treatments and Modalities:     1. Manual Therapy (CPT 84347), STM R piriformis w/ hip IR/ER    Time-based treatments/modalities:    Physical Therapy Timed Treatment Charges  Manual therapy minutes (CPT 07027): 8 minutes  Therapeutic exercise minutes (CPT 47684): 33 minutes      ASSESSMENT:   Response to treatment: Initiated proximal hip strengthening exercises. Pt tolerated exercises well and without pain. Demo normalized tonicity of piriformis muscle by end of " session. Pt is progressing at this time and appropriate to cont PT.    PLAN/RECOMMENDATIONS:   Plan for treatment: therapy treatment to continue next visit.  Planned interventions for next visit: continue with current treatment.

## 2024-08-18 DIAGNOSIS — M25.551 HIP PAIN, BILATERAL: ICD-10-CM

## 2024-08-18 DIAGNOSIS — M25.552 HIP PAIN, BILATERAL: ICD-10-CM

## 2024-08-19 RX ORDER — NAPROXEN 500 MG/1
500 TABLET ORAL 2 TIMES DAILY PRN
Qty: 60 TABLET | Refills: 3 | Status: SHIPPED | OUTPATIENT
Start: 2024-08-19

## 2024-08-19 NOTE — TELEPHONE ENCOUNTER
Received request via: Patient    Was the patient seen in the last year in this department? Yes    Does the patient have an active prescription (recently filled or refills available) for medication(s) requested? No    Pharmacy Name: [facility]    Does the patient have detention Plus and need 100-day supply? (This applies to ALL medications) Patient does not have SCP

## 2024-08-21 ENCOUNTER — PHYSICAL THERAPY (OUTPATIENT)
Dept: PHYSICAL THERAPY | Facility: REHABILITATION | Age: 59
End: 2024-08-21
Attending: INTERNAL MEDICINE
Payer: COMMERCIAL

## 2024-08-21 DIAGNOSIS — M25.551 HIP PAIN, RIGHT: ICD-10-CM

## 2024-08-21 PROCEDURE — 97110 THERAPEUTIC EXERCISES: CPT

## 2024-08-21 PROCEDURE — 97014 ELECTRIC STIMULATION THERAPY: CPT

## 2024-08-21 NOTE — OP THERAPY DAILY TREATMENT
"  Outpatient Physical Therapy  DAILY TREATMENT     [facility] Physical [address]  [address]  [place] [address]  Phone:  [phone]  Fax:  [phone]    Date: 08/21/2024    Patient: Zaire He  YOB: 1965  MRN: [id number]     Time Calculation    Start time: 1115  Stop time: 1210 Time Calculation (min): 55 minutes         Chief Complaint: No chief complaint on file.    Visit #: 3    SUBJECTIVE:  Pt reports no changes in symptoms. Reports sorenes in R buttock now. Following hep. Pt. Going for an MRI next month.   Pain (pre-rx): 3-4/10  Location: 1. R buttock   2. calves  Descriptors: 1. sore  2. Tight and sore   Aggravated with 1. Work: supine, selam. R sidelying, standing, walking prolonged; Eases with meds    OBJECTIVE:  Current objective measures:   Lumbar ROM  LSB: painful R hip  RSB: painful R hip            Therapeutic Exercises (CPT 74348):     1. supine, seated Figure 4 piriformis stretch L, R, 2x30\" hold    2. Supine sciatic nerve glide L, R, x10    3. Bridge marches, to fatigue, nt    4. Standing fire hydrant, 5x15\" hold ea    5. Bird dogs, nt    6. Qped hip extension, 2x15-30\" hold ea    7. Clams w/ OTB, 1x10      Therapeutic Exercise Summary: Access Code: [email or web address]  URL: [email or web address]  Date: 08/07/2024  Prepared by: Mónica Grant    Exercises  - Supine Figure 4 Piriformis Stretch  - 2 x daily - 7 x weekly - 3 sets - 30 second hold  - Supine Sciatic Nerve Glide  - 2 x daily - 7 x weekly - 10 reps  - Marching Bridge  - 1 x daily - 7 x weekly - 3 sets  - Standing Hip Abduction with Bent Knee  - 1 x daily - 7 x weekly - 3 sets - 10 reps    Therapeutic Treatments and Modalities:     1. E Stim Unattended (CPT 76600), IFC L/S bias L sweep w/mhp x15'    2. Manual Therapy (CPT 80832), see below    Therapeutic Treatment and Modalities Summary: MT L/S :Paivms L4 PA grade 4- 1-2 bouts ea; L Unilateral PA L4  grade 4- 2-3 bouts ea      Comments:'I " feel less sx'           Time-based treatments/modalities:    Physical Therapy Timed Treatment Charges  Therapeutic exercise minutes (CPT 55353): 38 minutes      ASSESSMENT:   Response to treatment: initiated MT/paivms; cont. proximal hip strengthening exercises. Pt tolerated MT/exercises well with reduced sx. Pt is progressing at this time and appropriate to cont PT.    PLAN/RECOMMENDATIONS:   Plan for treatment: therapy treatment to continue next visit.  Planned interventions for next visit: continue with current treatment.

## 2024-08-28 ENCOUNTER — PHYSICAL THERAPY (OUTPATIENT)
Dept: PHYSICAL THERAPY | Facility: REHABILITATION | Age: 59
End: 2024-08-28
Attending: INTERNAL MEDICINE
Payer: COMMERCIAL

## 2024-08-28 DIAGNOSIS — M25.551 RIGHT HIP PAIN: ICD-10-CM

## 2024-08-28 PROCEDURE — 97014 ELECTRIC STIMULATION THERAPY: CPT

## 2024-08-28 PROCEDURE — 97140 MANUAL THERAPY 1/> REGIONS: CPT

## 2024-08-28 PROCEDURE — 97110 THERAPEUTIC EXERCISES: CPT

## 2024-08-28 NOTE — OP THERAPY DAILY TREATMENT
"Outpatient Physical Therapy  DAILY TREATMENT     [facility] Physical [address].  [address]  [place] [address]  Phone:  [phone]  Fax:  [phone]    Date: 08/28/2024    Patient: Zaire He  YOB: 1965  MRN: [id number]     Time Calculation    Start time: 1115  Stop time: 1200 Time Calculation (min): 45 minutes         Chief Complaint: No chief complaint on file.    Visit #: 4    SUBJECTIVE:  Pt reports feeling MT helped his sx. reports sorenes in R buttock now. Pt. Going for an MRI next month.   Pain (pre-rx): 3/10  Location: 1. R buttock   2. calves  Descriptors: 1. sore  2. Tight and occasional cramping   Aggravated with 1. Work, selam. R sidelying, walking prolonged; Eases with meds    OBJECTIVE:  Current objective measures:   Lumbar ROM  RSB: painful R post hip            Therapeutic Exercises (CPT 09681):     1. seated R Figure 4 piriformis, HSS, wall/gastroc stretch R, 2x30\" hold    2. Supine sciatic nerve glide L, R, x10, nt    3. Bridge marches, to fatigue, nt    4. Standing fire hydrant, 5x15\" hold ea, nt    5. Bird dogs, nt    6. Qped hip extension, 5x5\",1x 15-30\" hold ea    7. Clams w/ OTB, 1x10, nt    8. PPU, 14  to forearms, TC to encourage T/S motion    9. Qped sit back, 5x 10\", TC to align back      Therapeutic Exercise Summary: Access Code: [email or web address]  URL: [email or web address]  Date: 08/07/2024  Prepared by: Mónica Grant    Exercises  - Supine Figure 4 Piriformis Stretch  - 2 x daily - 7 x weekly - 3 sets - 30 second hold  - Supine Sciatic Nerve Glide  - 2 x daily - 7 x weekly - 10 reps  - Marching Bridge  - 1 x daily - 7 x weekly - 3 sets  - Standing Hip Abduction with Bent Knee  - 1 x daily - 7 x weekly - 3 sets - 10 reps    Therapeutic Treatments and Modalities:     1. E Stim Unattended (CPT 38976), IFC L/S R gluteal/sciatic notch sweep w/mhp x15'    2. Manual Therapy (CPT 61402), see below    Therapeutic Treatment and Modalities " Summary: MT L/S :Paivms L4, 5 PA grade 4- 1 bouts ea; L Unilateral PA L4  grade 4- 2-3 bouts ea      Comments:'I feel less sx'           Time-based treatments/modalities:    Physical Therapy Timed Treatment Charges  Manual therapy minutes (CPT 28626): 15 minutes  Therapeutic exercise minutes (CPT 20133): 15 minutes      ASSESSMENT:   Response to treatment: cont. MT/paivms, ROM strengthening exercises with reduced sx. Taught how to stretch the Les in sitting/standing (for when he works).  Pt is progressing at this time and appropriate to cont PT.    PLAN/RECOMMENDATIONS:   Plan for treatment: therapy treatment to continue next visit.  Planned interventions for next visit: continue with current treatment.

## 2024-09-12 ENCOUNTER — PHYSICAL THERAPY (OUTPATIENT)
Dept: PHYSICAL THERAPY | Facility: REHABILITATION | Age: 59
End: 2024-09-12
Attending: INTERNAL MEDICINE
Payer: COMMERCIAL

## 2024-09-12 DIAGNOSIS — M25.552 BILATERAL HIP PAIN: ICD-10-CM

## 2024-09-12 DIAGNOSIS — M25.551 BILATERAL HIP PAIN: ICD-10-CM

## 2024-09-12 PROCEDURE — 97140 MANUAL THERAPY 1/> REGIONS: CPT

## 2024-09-12 PROCEDURE — 97014 ELECTRIC STIMULATION THERAPY: CPT

## 2024-09-12 PROCEDURE — 97110 THERAPEUTIC EXERCISES: CPT

## 2024-09-27 DIAGNOSIS — I10 ESSENTIAL HYPERTENSION: ICD-10-CM

## 2024-09-27 NOTE — TELEPHONE ENCOUNTER
Received request via: Pharmacy    Was the patient seen in the last year in this department? Yes    Does the patient have an active prescription (recently filled or refills available) for medication(s) requested? No    Pharmacy Name: [facility]/pharmacy #[id number] - [place], [place] - [address] [facility]     Does the patient have halfway Plus and need 100-day supply? (This applies to ALL medications) Patient does not have SCP

## 2024-09-28 RX ORDER — LISINOPRIL 20 MG/1
20 TABLET ORAL DAILY
Qty: 90 TABLET | Refills: 3 | Status: SHIPPED | OUTPATIENT
Start: 2024-09-28

## 2024-10-09 ENCOUNTER — APPOINTMENT (OUTPATIENT)
Dept: RADIOLOGY | Facility: MEDICAL CENTER | Age: 59
End: 2024-10-09
Attending: ORTHOPAEDIC SURGERY
Payer: COMMERCIAL

## 2024-10-09 DIAGNOSIS — M89.9 LYTIC BONE LESIONS ON XRAY: ICD-10-CM

## 2024-10-09 PROCEDURE — 700117 HCHG RX CONTRAST REV CODE 255: Mod: JZ | Performed by: ORTHOPAEDIC SURGERY

## 2024-10-09 PROCEDURE — A9579 GAD-BASE MR CONTRAST NOS,1ML: HCPCS | Mod: JZ | Performed by: ORTHOPAEDIC SURGERY

## 2024-10-09 PROCEDURE — 73723 MRI JOINT LWR EXTR W/O&W/DYE: CPT | Mod: RT

## 2024-10-09 RX ADMIN — GADOTERIDOL 15 ML: 279.3 INJECTION, SOLUTION INTRAVENOUS at 15:53

## 2025-08-11 RX ORDER — FENOFIBRATE 145 MG/1
145 TABLET, FILM COATED ORAL DAILY
Qty: 90 TABLET | Refills: 0 | Status: SHIPPED | OUTPATIENT
Start: 2025-08-11

## (undated) DEVICE — NEPTUNE 4 PORT MANIFOLD - (20/PK)

## (undated) DEVICE — LACTATED RINGERS INJ 1000 ML - (14EA/CA 60CA/PF)

## (undated) DEVICE — NEEDLE INSFL 120MM 14GA VRRS - (20/BX)

## (undated) DEVICE — SUTURE 0 COATED VICRYL 6-18IN - (12PK/BX)

## (undated) DEVICE — TROCAR 5X100 BLADED Z-THREAD - KII (6/BX)

## (undated) DEVICE — HEAD HOLDER JUNIOR/ADULT

## (undated) DEVICE — PROTECTOR ULNA NERVE - (36PR/CA)

## (undated) DEVICE — PACK LAP CHOLE OR - (2EA/CA)

## (undated) DEVICE — SUTURE 0 VICRYL PLUS UR-6 - 27 INCH (36/BX)

## (undated) DEVICE — ELECTRODE 850 FOAM ADHESIVE - HYDROGEL RADIOTRNSPRNT (50/PK)

## (undated) DEVICE — TUBING CLEARLINK DUO-VENT - C-FLO (48EA/CA)

## (undated) DEVICE — CANNULA W/SEAL 5X100 Z-THRE - ADED KII (12/BX)

## (undated) DEVICE — TUBE CONNECT SUCTION CLEAR 120 X 1/4" (50EA/CA)"

## (undated) DEVICE — DRAPESURG STERI-DRAPE LONG - (10/BX 4BX/CA)

## (undated) DEVICE — SUTURE GENERAL

## (undated) DEVICE — GLOVE BIOGEL ECLIPSE PF LATEX SIZE 8.0  (50PR/BX)

## (undated) DEVICE — SET SUCTION/IRRIGATION WITH DISPOSABLE TIP (6/CA )PART #0250-070-520 IS A SUB

## (undated) DEVICE — CANNULA W/SEAL11X100ZTHREAD - (12/BX)

## (undated) DEVICE — BAG RETRIEVAL 10ML (10EA/BX)

## (undated) DEVICE — CANISTER SUCTION 3000ML MECHANICAL FILTER AUTO SHUTOFF MEDI-VAC NONSTERILE LF DISP  (40EA/CA)

## (undated) DEVICE — SODIUM CHL IRRIGATION 0.9% 1000ML (12EA/CA)

## (undated) DEVICE — SET LEADWIRE 5 LEAD BEDSIDE DISPOSABLE ECG (1SET OF 5/EA)

## (undated) DEVICE — SUCTION INSTRUMENT YANKAUER BULBOUS TIP W/O VENT (50EA/CA)

## (undated) DEVICE — DRESSING TRANSPARENT FILM TEGADERM 2.375 X 2.75"  (100EA/BX)"

## (undated) DEVICE — KIT ANESTHESIA W/CIRCUIT & 3/LT BAG W/FILTER (20EA/CA)

## (undated) DEVICE — CHLORAPREP 26 ML APPLICATOR - ORANGE TINT(25/CA)

## (undated) DEVICE — TROCAR Z THREAD 11 X 100 - BLADED (6/BX)

## (undated) DEVICE — MASK ANESTHESIA ADULT  - (100/CA)

## (undated) DEVICE — GLOVE BIOGEL INDICATOR SZ 8 SURGICAL PF LTX - (50/BX 4BX/CA)

## (undated) DEVICE — CLIP MED LG INTNL HRZN TI ESCP - (20/BX)

## (undated) DEVICE — SPONGE GAUZESTER. 2X2 4-PL - (2/PK 50PK/BX 30BX/CS)

## (undated) DEVICE — SENSOR SPO2 NEO LNCS ADHESIVE (20/BX) SEE USER NOTES

## (undated) DEVICE — ELECTRODE DUAL RETURN W/ CORD - (50/PK)

## (undated) DEVICE — SUTURE 4-0 VICRYL PLUS FS-2 - 27 INCH (36/BX)

## (undated) DEVICE — GOWN WARMING STANDARD FLEX - (30/CA)

## (undated) DEVICE — SET EXTENSION WITH 2 PORTS (48EA/CA) ***PART #2C8610 IS A SUBSTITUTE*****

## (undated) DEVICE — WATER IRRIGATION STERILE 1000ML (12EA/CA)